# Patient Record
Sex: FEMALE | Race: BLACK OR AFRICAN AMERICAN | NOT HISPANIC OR LATINO | Employment: OTHER | URBAN - METROPOLITAN AREA
[De-identification: names, ages, dates, MRNs, and addresses within clinical notes are randomized per-mention and may not be internally consistent; named-entity substitution may affect disease eponyms.]

---

## 2017-02-23 ENCOUNTER — HOSPITAL ENCOUNTER (OUTPATIENT)
Dept: FAMILY MEDICINE CLINIC | Facility: CLINIC | Age: 67
Setting detail: SPECIMEN
Discharge: HOME | End: 2017-02-23
Attending: FAMILY MEDICINE | Admitting: FAMILY MEDICINE

## 2017-02-23 LAB
ALBUMIN SERPL-MCNC: 4 G/DL (ref 3.5–4.8)
ALBUMIN/GLOB SERPL: 1.1 {RATIO} (ref 1–1.7)
ALP SERPL-CCNC: 63 IU/L (ref 32–91)
ALT SERPL-CCNC: 27 IU/L (ref 14–54)
ANION GAP SERPL CALC-SCNC: 12.1 MMOL/L (ref 10–20)
AST SERPL-CCNC: 31 IU/L (ref 15–41)
BILIRUB SERPL-MCNC: 0.9 MG/DL (ref 0.3–1.2)
BUN SERPL-MCNC: 14 MG/DL (ref 8–20)
BUN/CREAT SERPL: 17.5 (ref 5.4–26.2)
CALCIUM SERPL-MCNC: 9.5 MG/DL (ref 8.9–10.3)
CHLORIDE SERPL-SCNC: 104 MMOL/L (ref 101–111)
CHOLEST SERPL-MCNC: 376 MG/DL
CHOLEST/HDLC SERPL: 4.6 {RATIO}
CONV CO2: 26 MMOL/L (ref 22–32)
CONV LDL CHOLESTEROL DIRECT: 252 MG/DL (ref 0–100)
CONV TOTAL PROTEIN: 7.7 G/DL (ref 6.1–7.9)
CREAT UR-MCNC: 0.8 MG/DL (ref 0.4–1)
GLOBULIN UR ELPH-MCNC: 3.7 G/DL (ref 2.5–3.8)
GLUCOSE SERPL-MCNC: 99 MG/DL (ref 65–99)
HDLC SERPL-MCNC: 82 MG/DL
LDLC/HDLC SERPL: 3.1 {RATIO}
LIPID INTERPRETATION: ABNORMAL
POTASSIUM SERPL-SCNC: 4.1 MMOL/L (ref 3.6–5.1)
SODIUM SERPL-SCNC: 138 MMOL/L (ref 136–144)
TRIGL SERPL-MCNC: 67 MG/DL
VLDLC SERPL CALC-MCNC: 42.1 MG/DL

## 2017-08-22 ENCOUNTER — HOSPITAL ENCOUNTER (OUTPATIENT)
Dept: FAMILY MEDICINE CLINIC | Facility: CLINIC | Age: 67
Setting detail: SPECIMEN
Discharge: HOME | End: 2017-08-22
Attending: FAMILY MEDICINE | Admitting: FAMILY MEDICINE

## 2017-08-22 LAB
ALBUMIN SERPL-MCNC: 4.3 G/DL (ref 3.5–4.8)
ALBUMIN/GLOB SERPL: 1.1 {RATIO} (ref 1–1.7)
ALP SERPL-CCNC: 61 IU/L (ref 32–91)
ALT SERPL-CCNC: 34 IU/L (ref 14–54)
ANION GAP SERPL CALC-SCNC: 13.6 MMOL/L (ref 10–20)
AST SERPL-CCNC: 35 IU/L (ref 15–41)
BILIRUB SERPL-MCNC: 0.9 MG/DL (ref 0.3–1.2)
BUN SERPL-MCNC: 13 MG/DL (ref 8–20)
BUN/CREAT SERPL: 13 (ref 5.4–26.2)
CALCIUM SERPL-MCNC: 10 MG/DL (ref 8.9–10.3)
CHLORIDE SERPL-SCNC: 104 MMOL/L (ref 101–111)
CHOLEST SERPL-MCNC: 357 MG/DL
CHOLEST/HDLC SERPL: 4.3 {RATIO}
CONV CO2: 26 MMOL/L (ref 22–32)
CONV LDL CHOLESTEROL DIRECT: 246 MG/DL (ref 0–100)
CONV TOTAL PROTEIN: 8.1 G/DL (ref 6.1–7.9)
CREAT UR-MCNC: 1 MG/DL (ref 0.4–1)
GLOBULIN UR ELPH-MCNC: 3.8 G/DL (ref 2.5–3.8)
GLUCOSE SERPL-MCNC: 110 MG/DL (ref 65–99)
HDLC SERPL-MCNC: 83 MG/DL
LDLC/HDLC SERPL: 3 {RATIO}
LIPID INTERPRETATION: ABNORMAL
POTASSIUM SERPL-SCNC: 4.6 MMOL/L (ref 3.6–5.1)
SODIUM SERPL-SCNC: 139 MMOL/L (ref 136–144)
TRIGL SERPL-MCNC: 134 MG/DL
VLDLC SERPL CALC-MCNC: 28.8 MG/DL

## 2018-01-23 ENCOUNTER — HOSPITAL ENCOUNTER (OUTPATIENT)
Dept: FAMILY MEDICINE CLINIC | Facility: CLINIC | Age: 68
Setting detail: SPECIMEN
Discharge: HOME | End: 2018-01-23
Attending: FAMILY MEDICINE | Admitting: FAMILY MEDICINE

## 2018-01-23 LAB
ALBUMIN SERPL-MCNC: 4.2 G/DL (ref 3.5–4.8)
ALBUMIN/GLOB SERPL: 1.2 {RATIO} (ref 1–1.7)
ALP SERPL-CCNC: 58 IU/L (ref 32–91)
ALT SERPL-CCNC: 23 IU/L (ref 14–54)
ANION GAP SERPL CALC-SCNC: 12.4 MMOL/L (ref 10–20)
AST SERPL-CCNC: 26 IU/L (ref 15–41)
BASOPHILS # BLD AUTO: 0 10*3/UL (ref 0–0.2)
BASOPHILS NFR BLD AUTO: 0 % (ref 0–2)
BILIRUB SERPL-MCNC: 0.9 MG/DL (ref 0.3–1.2)
BUN SERPL-MCNC: 13 MG/DL (ref 8–20)
BUN/CREAT SERPL: 10.8 (ref 5.4–26.2)
CALCIUM SERPL-MCNC: 9.8 MG/DL (ref 8.9–10.3)
CHLORIDE SERPL-SCNC: 102 MMOL/L (ref 101–111)
CONV CO2: 26 MMOL/L (ref 22–32)
CONV TOTAL PROTEIN: 7.7 G/DL (ref 6.1–7.9)
CREAT UR-MCNC: 1.2 MG/DL (ref 0.4–1)
DIFFERENTIAL METHOD BLD: (no result)
EOSINOPHIL # BLD AUTO: 0 10*3/UL (ref 0–0.3)
EOSINOPHIL # BLD AUTO: 1 % (ref 0–3)
ERYTHROCYTE [DISTWIDTH] IN BLOOD BY AUTOMATED COUNT: 13.9 % (ref 11.5–14.5)
GLOBULIN UR ELPH-MCNC: 3.5 G/DL (ref 2.5–3.8)
GLUCOSE SERPL-MCNC: 115 MG/DL (ref 65–99)
HCT VFR BLD AUTO: 40.6 % (ref 35–49)
HGB BLD-MCNC: 13.3 G/DL (ref 12–15)
LYMPHOCYTES # BLD AUTO: 1.3 10*3/UL (ref 0.8–4.8)
LYMPHOCYTES NFR BLD AUTO: 26 % (ref 18–42)
MCH RBC QN AUTO: 26.7 PG (ref 26–32)
MCHC RBC AUTO-ENTMCNC: 32.8 G/DL (ref 32–36)
MCV RBC AUTO: 81.3 FL (ref 80–94)
MONOCYTES # BLD AUTO: 0.5 10*3/UL (ref 0.1–1.3)
MONOCYTES NFR BLD AUTO: 10 % (ref 2–11)
NEUTROPHILS # BLD AUTO: 3.1 10*3/UL (ref 2.3–8.6)
NEUTROPHILS NFR BLD AUTO: 63 % (ref 50–75)
NRBC BLD AUTO-RTO: 0 /100{WBCS}
NRBC/RBC NFR BLD MANUAL: 0 10*3/UL
PLATELET # BLD AUTO: 218 10*3/UL (ref 150–450)
PMV BLD AUTO: 9.4 FL (ref 7.4–10.4)
POTASSIUM SERPL-SCNC: 4.4 MMOL/L (ref 3.6–5.1)
RBC # BLD AUTO: 4.99 10*6/UL (ref 4–5.4)
SODIUM SERPL-SCNC: 136 MMOL/L (ref 136–144)
WBC # BLD AUTO: 5.1 10*3/UL (ref 4.5–11.5)

## 2018-02-22 ENCOUNTER — HOSPITAL ENCOUNTER (OUTPATIENT)
Dept: FAMILY MEDICINE CLINIC | Facility: CLINIC | Age: 68
Setting detail: SPECIMEN
Discharge: HOME | End: 2018-02-22
Attending: FAMILY MEDICINE | Admitting: FAMILY MEDICINE

## 2018-02-22 LAB
CHOLEST SERPL-MCNC: 359 MG/DL
CHOLEST/HDLC SERPL: 4.3 {RATIO}
CONV LDL CHOLESTEROL DIRECT: 245 MG/DL (ref 0–100)
HDLC SERPL-MCNC: 83 MG/DL
LDLC/HDLC SERPL: 2.9 {RATIO}
LIPID INTERPRETATION: ABNORMAL
TRIGL SERPL-MCNC: 62 MG/DL
VLDLC SERPL CALC-MCNC: 30.9 MG/DL

## 2018-08-30 ENCOUNTER — HOSPITAL ENCOUNTER (OUTPATIENT)
Dept: FAMILY MEDICINE CLINIC | Facility: CLINIC | Age: 68
Setting detail: SPECIMEN
Discharge: HOME | End: 2018-08-30
Attending: FAMILY MEDICINE | Admitting: FAMILY MEDICINE

## 2018-08-30 LAB
ALBUMIN SERPL-MCNC: 4.1 G/DL (ref 3.5–4.8)
ALBUMIN/GLOB SERPL: 1.2 {RATIO} (ref 1–1.7)
ALP SERPL-CCNC: 67 IU/L (ref 32–91)
ALT SERPL-CCNC: 26 IU/L (ref 14–54)
ANION GAP SERPL CALC-SCNC: 11.4 MMOL/L (ref 10–20)
AST SERPL-CCNC: 26 IU/L (ref 15–41)
BILIRUB SERPL-MCNC: 0.9 MG/DL (ref 0.3–1.2)
BUN SERPL-MCNC: 12 MG/DL (ref 8–20)
BUN/CREAT SERPL: 13.3 (ref 5.4–26.2)
CALCIUM SERPL-MCNC: 9.6 MG/DL (ref 8.9–10.3)
CHLORIDE SERPL-SCNC: 103 MMOL/L (ref 101–111)
CHOLEST SERPL-MCNC: 338 MG/DL
CHOLEST/HDLC SERPL: 3.6 {RATIO}
CONV CO2: 28 MMOL/L (ref 22–32)
CONV LDL CHOLESTEROL DIRECT: 213 MG/DL (ref 0–100)
CONV TOTAL PROTEIN: 7.5 G/DL (ref 6.1–7.9)
CREAT UR-MCNC: 0.9 MG/DL (ref 0.4–1)
GLOBULIN UR ELPH-MCNC: 3.4 G/DL (ref 2.5–3.8)
GLUCOSE SERPL-MCNC: 102 MG/DL (ref 65–99)
HDLC SERPL-MCNC: 95 MG/DL
LDLC/HDLC SERPL: 2.2 {RATIO}
LIPID INTERPRETATION: ABNORMAL
POTASSIUM SERPL-SCNC: 4.4 MMOL/L (ref 3.6–5.1)
SODIUM SERPL-SCNC: 138 MMOL/L (ref 136–144)
TRIGL SERPL-MCNC: 89 MG/DL
VLDLC SERPL CALC-MCNC: 30.4 MG/DL

## 2019-02-28 ENCOUNTER — HOSPITAL ENCOUNTER (OUTPATIENT)
Dept: FAMILY MEDICINE CLINIC | Facility: CLINIC | Age: 69
Setting detail: SPECIMEN
Discharge: HOME | End: 2019-02-28
Attending: FAMILY MEDICINE | Admitting: FAMILY MEDICINE

## 2019-02-28 LAB
ALBUMIN SERPL-MCNC: 4.1 G/DL (ref 3.5–4.8)
ALBUMIN/GLOB SERPL: 1.2 {RATIO} (ref 1–1.7)
ALP SERPL-CCNC: 65 IU/L (ref 32–91)
ALT SERPL-CCNC: 23 IU/L (ref 14–54)
ANION GAP SERPL CALC-SCNC: 13.9 MMOL/L (ref 10–20)
AST SERPL-CCNC: 25 IU/L (ref 15–41)
BILIRUB SERPL-MCNC: 0.8 MG/DL (ref 0.3–1.2)
BUN SERPL-MCNC: 16 MG/DL (ref 8–20)
BUN/CREAT SERPL: 20 (ref 5.4–26.2)
CALCIUM SERPL-MCNC: 9.3 MG/DL (ref 8.9–10.3)
CHLORIDE SERPL-SCNC: 101 MMOL/L (ref 101–111)
CHOLEST SERPL-MCNC: 332 MG/DL
CHOLEST/HDLC SERPL: 3.6 {RATIO}
CONV CO2: 24 MMOL/L (ref 22–32)
CONV LDL CHOLESTEROL DIRECT: 223 MG/DL (ref 0–100)
CONV TOTAL PROTEIN: 7.4 G/DL (ref 6.1–7.9)
CREAT UR-MCNC: 0.8 MG/DL (ref 0.4–1)
GLOBULIN UR ELPH-MCNC: 3.3 G/DL (ref 2.5–3.8)
GLUCOSE SERPL-MCNC: 95 MG/DL (ref 65–99)
HDLC SERPL-MCNC: 93 MG/DL
LDLC/HDLC SERPL: 2.4 {RATIO}
LIPID INTERPRETATION: ABNORMAL
POTASSIUM SERPL-SCNC: 3.9 MMOL/L (ref 3.6–5.1)
SODIUM SERPL-SCNC: 135 MMOL/L (ref 136–144)
TRIGL SERPL-MCNC: 69 MG/DL
VLDLC SERPL CALC-MCNC: 16.6 MG/DL

## 2019-07-12 ENCOUNTER — TELEPHONE (OUTPATIENT)
Dept: FAMILY MEDICINE CLINIC | Facility: CLINIC | Age: 69
End: 2019-07-12

## 2019-07-12 ENCOUNTER — TRANSCRIBE ORDERS (OUTPATIENT)
Dept: FAMILY MEDICINE CLINIC | Facility: CLINIC | Age: 69
End: 2019-07-12

## 2019-07-12 ENCOUNTER — TRANSCRIBE ORDERS (OUTPATIENT)
Dept: ADMINISTRATIVE | Facility: HOSPITAL | Age: 69
End: 2019-07-12

## 2019-07-12 DIAGNOSIS — Z12.39 SCREENING FOR BREAST CANCER: Primary | ICD-10-CM

## 2019-07-12 NOTE — TELEPHONE ENCOUNTER
ORDER INDEXED FOR Merged with [facility]. PT WILL JUST NEED TO CALL TO SCHEDULE. PT NOTIFIED.

## 2019-07-17 ENCOUNTER — OFFICE VISIT (OUTPATIENT)
Dept: CARDIOLOGY | Facility: CLINIC | Age: 69
End: 2019-07-17

## 2019-07-17 VITALS
DIASTOLIC BLOOD PRESSURE: 94 MMHG | HEIGHT: 68 IN | BODY MASS INDEX: 35.16 KG/M2 | OXYGEN SATURATION: 98 % | RESPIRATION RATE: 18 BRPM | SYSTOLIC BLOOD PRESSURE: 164 MMHG | WEIGHT: 232 LBS | HEART RATE: 92 BPM

## 2019-07-17 DIAGNOSIS — I10 ESSENTIAL HYPERTENSION: ICD-10-CM

## 2019-07-17 DIAGNOSIS — I25.83 CORONARY ARTERY DISEASE DUE TO LIPID RICH PLAQUE: ICD-10-CM

## 2019-07-17 DIAGNOSIS — E78.2 MIXED HYPERLIPIDEMIA: Primary | ICD-10-CM

## 2019-07-17 DIAGNOSIS — I25.10 CORONARY ARTERY DISEASE DUE TO LIPID RICH PLAQUE: ICD-10-CM

## 2019-07-17 PROCEDURE — 99214 OFFICE O/P EST MOD 30 MIN: CPT | Performed by: INTERNAL MEDICINE

## 2019-07-17 NOTE — PROGRESS NOTES
Subjective:     Encounter Date:07/17/2019      Patient ID: Bigg Spear is a 68 y.o. female.    Chief Complaint:  No chief complaint on file.      HPI:  67-year-old female patient with coronary artery risk factor significant for hypertension dyslipidemia who presented to [facility] with hypertensive emergency and a troponin elevation.  She underwent cardiac catheterization (4/27/2018: No more than 10 to 20% plaque with mildly elevated left ventricular end-diastolic filling pressures).    She returns today for routine follow-up.  We spent more than 30 minutes discussing dietary regimen and exercise regimen.    The following portions of the patient's history were reviewed and updated as appropriate: allergies, current medications, past family history, past medical history, past social history, past surgical history and problem list.    Problem List:  Patient Active Problem List   Diagnosis   • Mixed hyperlipidemia   • Essential hypertension   • Coronary artery disease due to lipid rich plaque       Past Medical History:  Past Medical History:   Diagnosis Date   • Coronary artery disease due to lipid rich plaque 7/17/2019   • Coronary artery disease due to lipid rich plaque 7/17/2019   • Hyperlipidemia    • Hypertension    • NSTEMI (non-ST elevated myocardial infarction) (CMS/Former[facility]) 04/27/2018       Past Surgical History:  Past Surgical History:   Procedure Laterality Date   • CARDIAC CATHETERIZATION  04/28/2019    Mildly elevated left heart filling pressures, normal epicardial anatomy with no significant CAD       Social History:  Social History     Socioeconomic History   • Marital status:      Spouse name: Not on file   • Number of children: Not on file   • Years of education: Not on file   • Highest education level: Not on file   Tobacco Use   • Smoking status: Never Smoker   • Smokeless tobacco: Never Used   Substance and Sexual Activity   • Alcohol use: No     Frequency: Never   • Drug  "use: No       Allergies:  Allergies   Allergen Reactions   • Contrast Dye Arrhythmia         Review of Symptoms:  Constitutional: Patient afebrile no chills or unexpected weight changes  Respiratory: No cough, no wheezing or dyspnea  Cardiovascular: No chest pain, palpitations, dyspnea, orthopnea and no edema  Gastrointestinal: No nausea, vomiting, constipation or diarrhea.  No melena or dark stools    All other systems reviewed and are negative         Objective:         /94 (BP Location: Left arm, Patient Position: Sitting, Cuff Size: Large Adult)   Pulse 92   Resp 18   Ht 172.7 cm (68\")   Wt 105 kg (232 lb)   SpO2 98%   BMI 35.28 kg/m²     Physical exam  Constitutional: well-nourished, and appears stated age in no acute distress  PERRL: Conjunctiva clear, no pallor, anicteric  HENMT: normocephalic, normal dentition, no cyanosis or pallor  Neck:no bruits, or thrills and bilateral normal carotid upstroke. Normal jugular venous pressure  Cardiovascular: No parasternal heaves an non-displaced focal PMI. Normal rate and rhythm: no rub, gallop, murmur or click and normal S1 and S2; no lower or upper extremity edema.   Lungs: unlabored, no wheezing with no rales or rhonchi on auscultation.  Extremities: Warm, no clubbing, cyanosis, or edema. Full and equal peripheral pulses in extremities with no bruits appreciated.   Abdomen: soft, non-tender, non-distended  Musculoskeletal: no joint tenderness or swelling and no erythema  Skin: Warm and dry, non-erythematous   Neuro:alert and normal affect. Oriented to time, place and person.       In-Office Procedure(s):  Procedures    ASCVD RIsk Score::  The ASCVD Risk score (Jcarlos DC Jr., et al., 2013) failed to calculate for the following reasons:    The valid total cholesterol range is 130 to 320 mg/dL    Recent Radiology:  Imaging Results (most recent)     None          Lab Review:   No visits with results within 2 Month(s) from this visit.   Latest known visit with " results is:   Hospital Outpatient Visit on 02/28/2019   Component Date Value   • Total Cholesterol 02/28/2019 332*   • Triglycerides 02/28/2019 69    • HDL Cholesterol 02/28/2019 93    • LDL Cholesterol  02/28/2019 223*   • VLDL Cholesterol 02/28/2019 16.6    • LDL/HDL Ratio 02/28/2019 2.4    • Chol/HDL Ratio 02/28/2019 3.6    • Lipid Interpretation 02/28/2019 Total Cholesterol, Total Triglycerides, LDL Cholesterol,and HDL Cholesterol:     • Lipid Interpretation 02/28/2019 TOTAL CHOLESTEROL                    HDL CHOLESTEROL  Desirable:               • Lipid Interpretation 02/28/2019 <200 mg/dL     Low HDL:            <40 mg/dL  Borderline High:   200-239 mg/dL     • Lipid Interpretation 02/28/2019    Normal:           40-60 mg/dL  High:           > or = 240 mg/dL        • Lipid Interpretation 02/28/2019 Desirable:          >60 mg/dL  TOTAL TRIGLYCERIDE                   LDL    • Lipid Interpretation 02/28/2019 CHOLESTEROL  Normal:               <150 mg/dL     Optimal:           <100 mg/dL    • Lipid Interpretation 02/28/2019  Borderline High:   150-199 mg/dL     Low Risk:       100-129 mg/dL  High:         • Lipid Interpretation 02/28/2019         200-499 mg/dL     Borderline High:130-159 mg/dL  Very High:      > or =    • Lipid Interpretation 02/28/2019 500 mg/dL     High:           160-189 mg/dL                                        • Lipid Interpretation 02/28/2019   Very High:   > or = 190 mg/dL  The following ratios of LDL to HDL and Total    • Lipid Interpretation 02/28/2019 Cholesterol to HDL are for information only:  LDL/HDL RATIO                        • Lipid Interpretation 02/28/2019    TOTAL CHOLESTEROL/HDL RATIO  Desirable:              <5           Low Risk:     • Lipid Interpretation 02/28/2019      3.3-4.4   Optimal:        < or = 3.5           Average Risk:   4.4-7.1        • Lipid Interpretation 02/28/2019                                    Medium Risk:    7.1-11                          • Lipid  Interpretation 02/28/2019                   High Risk:         >11       • Sodium 02/28/2019 135*   • Potassium 02/28/2019 3.9    • Chloride 02/28/2019 101    • CO2 02/28/2019 24    • Glucose 02/28/2019 95    • BUN 02/28/2019 16    • Creatinine 02/28/2019 0.8    • Calcium 02/28/2019 9.3    • Total Protein 02/28/2019 7.4    • Albumin 02/28/2019 4.1    • Total Bilirubin 02/28/2019 0.8    • Alkaline Phosphatase 02/28/2019 65    • AST (SGOT) 02/28/2019 25    • ALT (SGPT) 02/28/2019 23    • Anion Gap 02/28/2019 13.9    • BUN/Creatinine Ratio 02/28/2019 20.0    • GFR MDRD Non  Neeta* 02/28/2019 >60    • GFR MDRD  02/28/2019 >60    • Globulin 02/28/2019 3.3    • A/G Ratio 02/28/2019 1.2               Invalid input(s): ALKPO4                        Invalid input(s): LDLCALC                Assessment:          Diagnosis Plan   1. Mixed hyperlipidemia     2. Essential hypertension     3. Coronary artery disease due to lipid rich plaque            Plan:         1. Mixed hyperlipidemia  Controlled on medical therapy    2. Essential hypertension  Well-controlled on medical therapy.    3. Coronary artery disease due to lipid rich plaque  Nonobstructive.      Greater than 30 minutes of face-to-face time the patient, of which greater than 50% was spent counseling specifically discussing carbohydrate restriction and her dietary intake with increasing vegetable intake and increasing her exercise tolerance through walking 3 days a week.      Level of Care:                 Ed Quinn MD  07/17/19  .

## 2019-07-29 ENCOUNTER — HOSPITAL ENCOUNTER (OUTPATIENT)
Dept: MAMMOGRAPHY | Facility: HOSPITAL | Age: 69
Discharge: HOME | End: 2019-07-29
Admitting: FAMILY MEDICINE

## 2019-07-29 DIAGNOSIS — Z12.39 SCREENING FOR BREAST CANCER: ICD-10-CM

## 2019-07-29 PROCEDURE — 77067 SCR MAMMO BI INCL CAD: CPT

## 2019-07-29 PROCEDURE — 77063 BREAST TOMOSYNTHESIS BI: CPT

## 2019-09-18 ENCOUNTER — LAB (OUTPATIENT)
Dept: FAMILY MEDICINE CLINIC | Facility: CLINIC | Age: 69
End: 2019-09-18

## 2019-09-18 ENCOUNTER — OFFICE VISIT (OUTPATIENT)
Dept: FAMILY MEDICINE CLINIC | Facility: CLINIC | Age: 69
End: 2019-09-18

## 2019-09-18 VITALS
DIASTOLIC BLOOD PRESSURE: 76 MMHG | HEART RATE: 84 BPM | HEIGHT: 68 IN | SYSTOLIC BLOOD PRESSURE: 169 MMHG | WEIGHT: 226.2 LBS | OXYGEN SATURATION: 96 % | BODY MASS INDEX: 34.28 KG/M2 | TEMPERATURE: 97.6 F

## 2019-09-18 DIAGNOSIS — I10 ESSENTIAL HYPERTENSION: ICD-10-CM

## 2019-09-18 DIAGNOSIS — E78.2 MIXED HYPERLIPIDEMIA: ICD-10-CM

## 2019-09-18 DIAGNOSIS — E78.2 MIXED HYPERLIPIDEMIA: Primary | ICD-10-CM

## 2019-09-18 PROBLEM — L98.9 SKIN LESION: Status: ACTIVE | Noted: 2018-01-23

## 2019-09-18 PROBLEM — F41.9 ANXIETY DISORDER: Status: ACTIVE | Noted: 2019-09-18

## 2019-09-18 LAB
ALBUMIN SERPL-MCNC: 4.3 G/DL (ref 3.5–4.8)
ALBUMIN/GLOB SERPL: 1.4 G/DL (ref 1–1.7)
ALP SERPL-CCNC: 64 U/L (ref 32–91)
ALT SERPL W P-5'-P-CCNC: 26 U/L (ref 14–54)
ANION GAP SERPL CALCULATED.3IONS-SCNC: 12.1 MMOL/L (ref 5–15)
ARTICHOKE IGE QN: 274 MG/DL (ref 0–100)
AST SERPL-CCNC: 27 U/L (ref 15–41)
BILIRUB SERPL-MCNC: 0.8 MG/DL (ref 0.3–1.2)
BUN BLD-MCNC: 17 MG/DL (ref 8–20)
BUN/CREAT SERPL: 21.3 (ref 5.4–26.2)
CALCIUM SPEC-SCNC: 9.2 MG/DL (ref 8.9–10.3)
CHLORIDE SERPL-SCNC: 103 MMOL/L (ref 101–111)
CHOLEST SERPL-MCNC: 368 MG/DL
CO2 SERPL-SCNC: 25 MMOL/L (ref 22–32)
CREAT BLD-MCNC: 0.8 MG/DL (ref 0.4–1)
GFR SERPL CREATININE-BSD FRML MDRD: 86 ML/MIN/1.73
GLOBULIN UR ELPH-MCNC: 3 GM/DL (ref 2.5–3.8)
GLUCOSE BLD-MCNC: 94 MG/DL (ref 65–99)
HDLC SERPL QL: 4.23
HDLC SERPL-MCNC: 87 MG/DL
LDLC/HDLC SERPL: 3.05 {RATIO}
POTASSIUM BLD-SCNC: 4.1 MMOL/L (ref 3.6–5.1)
PROT SERPL-MCNC: 7.3 G/DL (ref 6.1–7.9)
SODIUM BLD-SCNC: 136 MMOL/L (ref 136–144)
TRIGL SERPL-MCNC: 78 MG/DL
VLDLC SERPL-MCNC: 15.6 MG/DL

## 2019-09-18 PROCEDURE — 80053 COMPREHEN METABOLIC PANEL: CPT | Performed by: FAMILY MEDICINE

## 2019-09-18 PROCEDURE — 36415 COLL VENOUS BLD VENIPUNCTURE: CPT | Performed by: FAMILY MEDICINE

## 2019-09-18 PROCEDURE — 80061 LIPID PANEL: CPT | Performed by: FAMILY MEDICINE

## 2019-09-18 PROCEDURE — 99213 OFFICE O/P EST LOW 20 MIN: CPT | Performed by: FAMILY MEDICINE

## 2019-09-18 NOTE — PROGRESS NOTES
Subjective   Bigg Spear is a 68 y.o. female.     Here for follow up on bp and chol  bp was 122/81 at home this am  Feels well         The following portions of the patient's history were reviewed and updated as appropriate: allergies, current medications, past family history, past medical history, past social history, past surgical history and problem list.  Past Medical History:   Diagnosis Date   • Coronary artery disease due to lipid rich plaque 7/17/2019   • Coronary artery disease due to lipid rich plaque 7/17/2019   • Fibrocystic breast    • Hyperlipidemia    • Hypertension    • NSTEMI (non-ST elevated myocardial infarction) (CMS/[facility]) 04/27/2018     Past Surgical History:   Procedure Laterality Date   • CARDIAC CATHETERIZATION  04/28/2019    Mildly elevated left heart filling pressures, normal epicardial anatomy with no significant CAD     Family History   Problem Relation Age of Onset   • No Known Problems Mother    • No Known Problems Father    • No Known Problems Sister    • Colon cancer Brother    • No Known Problems Daughter    • No Known Problems Son    • No Known Problems Maternal Grandmother    • No Known Problems Paternal Grandmother    • No Known Problems Maternal Aunt    • No Known Problems Paternal Aunt      Social History     Socioeconomic History   • Marital status:      Spouse name: Not on file   • Number of children: Not on file   • Years of education: Not on file   • Highest education level: Not on file   Tobacco Use   • Smoking status: Never Smoker   • Smokeless tobacco: Never Used   Substance and Sexual Activity   • Alcohol use: No     Frequency: Never   • Drug use: No         Current Outpatient Medications:   •  amLODIPine (NORVASC) 5 MG tablet, Take 5 mg by mouth Daily., Disp: , Rfl:   •  aspirin 81 MG EC tablet, Take 81 mg by mouth Daily., Disp: , Rfl:   •  atorvastatin (LIPITOR) 40 MG tablet, Take 40 mg by mouth Daily., Disp: , Rfl:   •  Garlic 1000 MG capsule, Take 1 tablet  "by mouth Daily., Disp: , Rfl:   •  lisinopril (PRINIVIL,ZESTRIL) 20 MG tablet, Take 20 mg by mouth Daily., Disp: , Rfl:   •  Multiple Vitamins-Minerals (MULTIVITAMIN WITH MINERALS) tablet tablet, Take 1 tablet by mouth Daily., Disp: , Rfl:     Review of Systems   Constitutional: Negative for diaphoresis, fatigue, fever, unexpected weight gain and unexpected weight loss.   Respiratory: Negative for cough, chest tightness and shortness of breath.    Cardiovascular: Negative for chest pain, palpitations and leg swelling.   Gastrointestinal: Negative for nausea and vomiting.   Neurological: Negative for dizziness, syncope and headache.     /76 (BP Location: Left arm, Patient Position: Sitting, Cuff Size: Adult)   Pulse 84   Temp 97.6 °F (36.4 °C) (Oral)   Ht 172.7 cm (68\")   Wt 103 kg (226 lb 3.2 oz)   SpO2 96%   BMI 34.39 kg/m²       Objective   Physical Exam   Constitutional: She appears well-developed and well-nourished. No distress. She is obese.  HENT:   Head: Normocephalic and atraumatic.   Neck: Neck supple. No JVD present. No thyromegaly present.   Cardiovascular: Normal rate, regular rhythm, normal heart sounds and intact distal pulses. Exam reveals no gallop and no friction rub.   No murmur heard.  Pulmonary/Chest: Effort normal and breath sounds normal. No respiratory distress. She has no wheezes. She has no rales.   Musculoskeletal: She exhibits no edema.   Lymphadenopathy:     She has no cervical adenopathy.   Neurological: She is alert.   Skin: Skin is warm and dry.   Nursing note and vitals reviewed.    Refused flu and pneumonia vaccines    Assessment/Plan   Problems Addressed this Visit        Cardiovascular and Mediastinum    Hyperlipidemia - Primary    Relevant Orders    Lipid Panel (Completed)    Comprehensive Metabolic Panel (Completed)    Hypertension    Relevant Orders    Lipid Panel (Completed)    Comprehensive Metabolic Panel (Completed)                 "

## 2019-10-01 RX ORDER — LISINOPRIL 20 MG/1
TABLET ORAL
Qty: 90 TABLET | Refills: 2 | Status: SHIPPED | OUTPATIENT
Start: 2019-10-01 | End: 2020-05-26

## 2020-02-19 ENCOUNTER — OFFICE VISIT (OUTPATIENT)
Dept: CARDIOLOGY | Facility: CLINIC | Age: 70
End: 2020-02-19

## 2020-02-19 VITALS
WEIGHT: 228 LBS | SYSTOLIC BLOOD PRESSURE: 130 MMHG | HEART RATE: 82 BPM | HEIGHT: 68 IN | BODY MASS INDEX: 34.56 KG/M2 | DIASTOLIC BLOOD PRESSURE: 82 MMHG | RESPIRATION RATE: 18 BRPM | OXYGEN SATURATION: 98 %

## 2020-02-19 DIAGNOSIS — I25.83 CORONARY ARTERY DISEASE DUE TO LIPID RICH PLAQUE: ICD-10-CM

## 2020-02-19 DIAGNOSIS — E78.2 MIXED HYPERLIPIDEMIA: Primary | ICD-10-CM

## 2020-02-19 DIAGNOSIS — I25.10 CORONARY ARTERY DISEASE DUE TO LIPID RICH PLAQUE: ICD-10-CM

## 2020-02-19 DIAGNOSIS — I10 ESSENTIAL HYPERTENSION: ICD-10-CM

## 2020-02-19 PROCEDURE — 99214 OFFICE O/P EST MOD 30 MIN: CPT | Performed by: INTERNAL MEDICINE

## 2020-02-19 NOTE — PROGRESS NOTES
Subjective:     Encounter Date:02/19/2020      Patient ID: Bigg Spear is a 69 y.o. female.    Chief Complaint:  Chief Complaint   Patient presents with   • Coronary Artery Disease   • Hypertension   • Hyperlipidemia       HPI:  67-year-old female patient with coronary artery risk factor significant for hypertension dyslipidemia who presented to [facility] with hypertensive emergency and a troponin elevation.  She underwent cardiac catheterization (4/27/2018: No more than 10 to 20% plaque with mildly elevated left ventricular end-diastolic filling pressures).    She returns today for routine follow-up.  I personally reviewed her most recent lipid profile Trg 78 HD 87  with and extremely favorable Trg/Hd ratio of ~0.9 indicating non-inflammatory state.  Amazingly, despite her severely elevated LDL her remaining lipid profile shows indicates cardioprotection; with a risk of developing death from coronary heart disease of less than 2% by standard classification.  She has no complaints from a cardiovascular standpoint.  We had a long discussion of eliminating carbohydrate rich foods from her diet and focusing on healthy fats and protein with cooked vegetables and fruits as an adjunctive.    The following portions of the patient's history were reviewed and updated as appropriate: allergies, current medications, past family history, past medical history, past social history, past surgical history and problem list.    Problem List:  Patient Active Problem List   Diagnosis   • Hyperlipidemia   • Hypertension   • Coronary artery disease due to lipid rich plaque   • Anxiety disorder   • Carpal tunnel syndrome   • Encounter for general adult medical examination without abnormal findings   • History of prosthetic heart valve   • Obesity   • Encounter for screening for malignant neoplasm of colon   • Postmenopausal status   • Skin lesion   • Vitamin D deficiency       Past Medical History:  Past  "Medical History:   Diagnosis Date   • Coronary artery disease due to lipid rich plaque 7/17/2019   • Coronary artery disease due to lipid rich plaque 7/17/2019   • Fibrocystic breast    • Hyperlipidemia    • Hypertension    • NSTEMI (non-ST elevated myocardial infarction) (CMS/[facility]) 04/27/2018       Past Surgical History:  Past Surgical History:   Procedure Laterality Date   • CARDIAC CATHETERIZATION  04/28/2019    Mildly elevated left heart filling pressures, normal epicardial anatomy with no significant CAD       Social History:  Social History     Socioeconomic History   • Marital status:      Spouse name: Not on file   • Number of children: Not on file   • Years of education: Not on file   • Highest education level: Not on file   Tobacco Use   • Smoking status: Never Smoker   • Smokeless tobacco: Never Used   Substance and Sexual Activity   • Alcohol use: No     Frequency: Never   • Drug use: No   • Sexual activity: Defer       Allergies:  Allergies   Allergen Reactions   • Dioxybenzone Shortness Of Breath   • Contrast Dye Arrhythmia   • Amoxicillin Palpitations         Review of Symptoms:  Constitutional: Patient afebrile no chills or unexpected weight changes  Respiratory: No cough, no wheezing or dyspnea  Cardiovascular: No chest pain, palpitations, dyspnea, orthopnea and no edema  Gastrointestinal: No nausea, vomiting, constipation or diarrhea.  No melena or dark stools    All other systems reviewed and are negative         Objective:         /82 (BP Location: Left arm, Patient Position: Sitting, Cuff Size: Large Adult)   Pulse 82   Resp 18   Ht 172.7 cm (68\")   Wt 103 kg (228 lb)   SpO2 98%   BMI 34.67 kg/m²     Physical exam  Constitutional: well-nourished, and appears stated age in no acute distress  PERRL: Conjunctiva clear, no pallor, anicteric  HENMT: normocephalic, normal dentition, no cyanosis or pallor  Neck:no bruits, or thrills and bilateral normal carotid upstroke. Normal " jugular venous pressure  Cardiovascular: No parasternal heaves an non-displaced focal PMI. Normal rate and rhythm: no rub, gallop, murmur or click and normal S1 and S2; no lower or upper extremity edema.   Lungs: unlabored, no wheezing with no rales or rhonchi on auscultation.  Extremities: Warm, no clubbing, cyanosis. Full and equal peripheral pulses in extremities with no bruits appreciated.   Abdomen: soft, non-tender, non-distended  Musculoskeletal: no joint tenderness or swelling and no erythema  Skin: Warm and dry, non-erythematous   Neuro:alert and normal affect. Oriented to time, place and person.       In-Office Procedure(s):  Procedures    ASCVD RIsk Score::  The ASCVD Risk score (Jcarlosjude BARTON Jr., et al., 2013) failed to calculate for the following reasons:    The valid total cholesterol range is 130 to 320 mg/dL    Recent Radiology:  Imaging Results (Most Recent)     None          Lab Review:   No visits with results within 2 Month(s) from this visit.   Latest known visit with results is:   Lab on 09/18/2019   Component Date Value   • Total Cholesterol 09/18/2019 368*   • Triglycerides 09/18/2019 78    • HDL Cholesterol 09/18/2019 87    • LDL Cholesterol  09/18/2019 274*   • VLDL Cholesterol 09/18/2019 15.6    • LDL/HDL Ratio 09/18/2019 3.05    • Chol/HDL Ratio 09/18/2019 4.23               Invalid input(s): ALKPO4                        Invalid input(s): LDLCALC                Assessment:          Diagnosis Plan   1. Mixed hyperlipidemia  Lipid Panel   2. Coronary artery disease due to lipid rich plaque     3. Essential hypertension            Plan:         1. Mixed hyperlipidemia  Will implement dietary modifications with increase in exercise.  Patient has a strong aversion to statins.  Repeat lipid profile in 6 weeks  - Lipid Panel; Future    2. Coronary artery disease due to lipid rich plaque  Clinically silent nonobstructive; she has no more than 10 to 20% luminal irregularities in her coronary  arteries.    3. Essential hypertension  Well-controlled on medical therapy    Greater than 30 minutes of face-to-face time was spent with the patient and family, of which greater than 50% was spent counseling specifically discussing dyslipidemia, treatment with medical and dietary therapy.        Level of Care:                 Ed Quinn MD  02/19/20  .

## 2020-02-27 ENCOUNTER — OFFICE VISIT (OUTPATIENT)
Dept: FAMILY MEDICINE CLINIC | Facility: CLINIC | Age: 70
End: 2020-02-27

## 2020-02-27 VITALS
DIASTOLIC BLOOD PRESSURE: 79 MMHG | HEART RATE: 68 BPM | OXYGEN SATURATION: 97 % | SYSTOLIC BLOOD PRESSURE: 183 MMHG | TEMPERATURE: 97.8 F | BODY MASS INDEX: 34.97 KG/M2 | WEIGHT: 230 LBS

## 2020-02-27 DIAGNOSIS — R06.2 WHEEZING: ICD-10-CM

## 2020-02-27 DIAGNOSIS — I10 ESSENTIAL HYPERTENSION: ICD-10-CM

## 2020-02-27 DIAGNOSIS — I10 WHITE COAT SYNDROME WITH DIAGNOSIS OF HYPERTENSION: Primary | ICD-10-CM

## 2020-02-27 DIAGNOSIS — R00.2 PALPITATIONS: ICD-10-CM

## 2020-02-27 PROCEDURE — 99214 OFFICE O/P EST MOD 30 MIN: CPT | Performed by: PHYSICIAN ASSISTANT

## 2020-02-27 RX ORDER — PREDNISONE 20 MG/1
20 TABLET ORAL DAILY
Qty: 3 TABLET | Refills: 0 | Status: SHIPPED | OUTPATIENT
Start: 2020-02-27 | End: 2020-03-01

## 2020-02-27 RX ORDER — ALBUTEROL SULFATE 90 UG/1
2 AEROSOL, METERED RESPIRATORY (INHALATION) EVERY 4 HOURS PRN
Qty: 1 INHALER | Refills: 1 | Status: SHIPPED | OUTPATIENT
Start: 2020-02-27

## 2020-02-27 NOTE — PROGRESS NOTES
Subjective  Bigg Spear is a 69 y.o. female     History of Present Illness  Patient is a 69-year-old black female complaining of cough, congestion for the past 2 weeks.  She states over-the-counter medicines have not been helpful.  Denies fevers, non-smoker.  She especially feels wheezing in her chest at bedtime.    The following portions of the patient's history were reviewed and updated as appropriate: allergies, current medications, past family history, past medical history, past social history, past surgical history and problem list.    Review of Systems   Constitutional: Negative for fever.   HENT: Positive for congestion. Negative for ear pain, sinus pressure and sore throat.    Eyes: Negative for blurred vision, pain and redness.   Respiratory: Positive for cough and wheezing. Negative for shortness of breath.    Cardiovascular: Negative for chest pain.   Neurological: Positive for dizziness, light-headedness and headache.       Objective  Physical Exam   Constitutional: She is oriented to person, place, and time. She appears well-developed and well-nourished.   HENT:   Head: Normocephalic and atraumatic.   Right Ear: External ear normal.   Left Ear: External ear normal.   Nose: Nose normal.   Mouth/Throat: Oropharynx is clear and moist.   Eyes: Pupils are equal, round, and reactive to light. Conjunctivae and EOM are normal.   Neck: Normal range of motion. Neck supple.   Cardiovascular: Normal rate, regular rhythm and normal heart sounds. Frequent extrasystoles are present.   Pulmonary/Chest: Effort normal and breath sounds normal.   Neurological: She is alert and oriented to person, place, and time.   Psychiatric: She has a normal mood and affect. Her behavior is normal.       Vitals:    02/27/20 1127   BP: (!) 183/79   BP Location: Left arm   Patient Position: Sitting   Cuff Size: Adult   Pulse: 68   Temp: 97.8 °F (36.6 °C)   TempSrc: Oral   SpO2: 97%   Weight: 104 kg (230 lb)     Body mass index is  34.97 kg/m².    PHQ-9 Total Score:        Assessment/Plan  Diagnoses and all orders for this visit:    1. White coat syndrome with diagnosis of hypertension (Primary)  Comments:  Patient states her blood pressure this morning was 120/84, she is to monitor at home and call if consistently elevated.    2. Wheezing  Comments:  Prescription for albuterol inhaler sent in for wheezing.    3. Essential hypertension  Comments:  Discussed with patient that lisinopril may not be the best option based on her ethnicity, she wants to discuss further with Dr. Herrera.  She declined my offer to change her blood pressure medication.    4. Palpitations  Comments:  New, discussed with patient at length, offered beta-blocker which she declined.  She wants to discuss further with cardiology.  ER precautions given.    Other orders  -     albuterol sulfate  (90 Base) MCG/ACT inhaler; Inhale 2 puffs Every 4 (Four) Hours As Needed for Wheezing.  Dispense: 1 inhaler; Refill: 1  -     predniSONE (DELTASONE) 20 MG tablet; Take 1 tablet by mouth Daily for 3 days.  Dispense: 3 tablet; Refill: 0

## 2020-04-18 RX ORDER — AMLODIPINE BESYLATE 5 MG/1
TABLET ORAL
Qty: 90 TABLET | Refills: 0 | Status: SHIPPED | OUTPATIENT
Start: 2020-04-18 | End: 2020-07-15

## 2020-05-26 ENCOUNTER — OFFICE VISIT (OUTPATIENT)
Dept: FAMILY MEDICINE CLINIC | Facility: CLINIC | Age: 70
End: 2020-05-26

## 2020-05-26 ENCOUNTER — LAB (OUTPATIENT)
Dept: FAMILY MEDICINE CLINIC | Facility: CLINIC | Age: 70
End: 2020-05-26

## 2020-05-26 VITALS
SYSTOLIC BLOOD PRESSURE: 155 MMHG | TEMPERATURE: 99.3 F | BODY MASS INDEX: 31.52 KG/M2 | HEART RATE: 102 BPM | WEIGHT: 208 LBS | DIASTOLIC BLOOD PRESSURE: 91 MMHG | HEIGHT: 68 IN | OXYGEN SATURATION: 95 %

## 2020-05-26 DIAGNOSIS — E78.2 MIXED HYPERLIPIDEMIA: ICD-10-CM

## 2020-05-26 DIAGNOSIS — R63.4 ABNORMAL WEIGHT LOSS: ICD-10-CM

## 2020-05-26 DIAGNOSIS — E78.2 MIXED HYPERLIPIDEMIA: Primary | ICD-10-CM

## 2020-05-26 DIAGNOSIS — I10 ESSENTIAL HYPERTENSION: ICD-10-CM

## 2020-05-26 LAB
ALBUMIN SERPL-MCNC: 4.6 G/DL (ref 3.5–5.2)
ALBUMIN/GLOB SERPL: 1.4 G/DL
ALP SERPL-CCNC: 64 U/L (ref 39–117)
ALT SERPL W P-5'-P-CCNC: 15 U/L (ref 1–33)
ANION GAP SERPL CALCULATED.3IONS-SCNC: 12.4 MMOL/L (ref 5–15)
AST SERPL-CCNC: 19 U/L (ref 1–32)
BILIRUB SERPL-MCNC: 0.4 MG/DL (ref 0.2–1.2)
BUN BLD-MCNC: 14 MG/DL (ref 8–23)
BUN/CREAT SERPL: 16.1 (ref 7–25)
CALCIUM SPEC-SCNC: 9.8 MG/DL (ref 8.6–10.5)
CHLORIDE SERPL-SCNC: 100 MMOL/L (ref 98–107)
CHOLEST SERPL-MCNC: 310 MG/DL (ref 0–200)
CO2 SERPL-SCNC: 23.6 MMOL/L (ref 22–29)
CREAT BLD-MCNC: 0.87 MG/DL (ref 0.57–1)
GFR SERPL CREATININE-BSD FRML MDRD: 78 ML/MIN/1.73
GLOBULIN UR ELPH-MCNC: 3.2 GM/DL
GLUCOSE BLD-MCNC: 100 MG/DL (ref 65–99)
HDLC SERPL-MCNC: 77 MG/DL (ref 40–60)
LDLC SERPL CALC-MCNC: 217 MG/DL (ref 0–100)
LDLC/HDLC SERPL: 2.81 {RATIO}
POTASSIUM BLD-SCNC: 3.9 MMOL/L (ref 3.5–5.2)
PROT SERPL-MCNC: 7.8 G/DL (ref 6–8.5)
SODIUM BLD-SCNC: 136 MMOL/L (ref 136–145)
TRIGL SERPL-MCNC: 82 MG/DL (ref 0–150)
TSH SERPL DL<=0.05 MIU/L-ACNC: 1.87 UIU/ML (ref 0.27–4.2)
VLDLC SERPL-MCNC: 16.4 MG/DL (ref 5–40)

## 2020-05-26 PROCEDURE — 80053 COMPREHEN METABOLIC PANEL: CPT | Performed by: FAMILY MEDICINE

## 2020-05-26 PROCEDURE — 36415 COLL VENOUS BLD VENIPUNCTURE: CPT | Performed by: FAMILY MEDICINE

## 2020-05-26 PROCEDURE — 99213 OFFICE O/P EST LOW 20 MIN: CPT | Performed by: FAMILY MEDICINE

## 2020-05-26 PROCEDURE — 84443 ASSAY THYROID STIM HORMONE: CPT | Performed by: FAMILY MEDICINE

## 2020-05-26 PROCEDURE — 80061 LIPID PANEL: CPT | Performed by: FAMILY MEDICINE

## 2020-05-26 RX ORDER — METOPROLOL SUCCINATE 50 MG/1
50 TABLET, EXTENDED RELEASE ORAL DAILY
Qty: 90 TABLET | Refills: 3 | Status: SHIPPED | OUTPATIENT
Start: 2020-05-26 | End: 2020-06-03

## 2020-05-26 NOTE — PATIENT INSTRUCTIONS
Stop the lisinipril  Limit salt and caffeine  Keep working to lose weight through healthy eating and exercise.

## 2020-05-26 NOTE — PROGRESS NOTES
Subjective   Xavierse MABLE Spear is a 69 y.o. female.     She comes in for follow-up on her blood pressure as it has been elevated at home whereas normally it is only elevated here  She is currently on amlodipine and lisinopril  Headache and blurred vision  Has an appt to see her eye doctor next month  Misses her grandkids sec to the pandemic  She has dropped 22 pounds in the last few months  Eating less as she misses her children  Told by Zita at her last appointment that lisinopril is not always the best choice of medication in [place]  She is not sure if that affected her thinking and triggered a rise in her anxiety and elevation in her blood pressure  She says she is a worrier       The following portions of the patient's history were reviewed and updated as appropriate: allergies, current medications, past family history, past medical history, past social history, past surgical history and problem list.  Past Medical History:   Diagnosis Date   • Coronary artery disease due to lipid rich plaque 7/17/2019   • Coronary artery disease due to lipid rich plaque 7/17/2019   • Fibrocystic breast    • Hyperlipidemia    • Hypertension    • NSTEMI (non-ST elevated myocardial infarction) (CMS/[facility]) 04/27/2018     Past Surgical History:   Procedure Laterality Date   • CARDIAC CATHETERIZATION  04/28/2019    Mildly elevated left heart filling pressures, normal epicardial anatomy with no significant CAD     Family History   Problem Relation Age of Onset   • No Known Problems Mother    • No Known Problems Father    • No Known Problems Sister    • Colon cancer Brother    • No Known Problems Daughter    • No Known Problems Son    • No Known Problems Maternal Grandmother    • No Known Problems Paternal Grandmother    • No Known Problems Maternal Aunt    • No Known Problems Paternal Aunt      Social History     Socioeconomic History   • Marital status:      Spouse name: Not on file   • Number of children: Not on  "file   • Years of education: Not on file   • Highest education level: Not on file   Tobacco Use   • Smoking status: Never Smoker   • Smokeless tobacco: Never Used   Substance and Sexual Activity   • Alcohol use: No     Frequency: Never   • Drug use: No   • Sexual activity: Not Currently         Current Outpatient Medications:   •  amLODIPine (NORVASC) 5 MG tablet, Take 1 tablet by mouth once daily, Disp: 90 tablet, Rfl: 0  •  Garlic 1000 MG capsule, Take 1 tablet by mouth Daily., Disp: , Rfl:   •  Multiple Vitamins-Minerals (MULTIVITAMIN WITH MINERALS) tablet tablet, Take 1 tablet by mouth Daily., Disp: , Rfl:   •  Omega-3 Fatty Acids (EQL OMEGA 3 FISH OIL) 1400 MG capsule, Take 1 capsule by mouth 2 (Two) Times a Day., Disp: , Rfl:   •  albuterol sulfate  (90 Base) MCG/ACT inhaler, Inhale 2 puffs Every 4 (Four) Hours As Needed for Wheezing., Disp: 1 inhaler, Rfl: 1  •  metoprolol succinate XL (Toprol XL) 50 MG 24 hr tablet, Take 1 tablet by mouth Daily., Disp: 90 tablet, Rfl: 3    Review of Systems   Constitutional: Negative for diaphoresis, fatigue, fever, unexpected weight gain and unexpected weight loss.   Eyes: Positive for blurred vision.   Respiratory: Negative for cough, chest tightness and shortness of breath.    Cardiovascular: Negative for chest pain, palpitations and leg swelling.   Gastrointestinal: Negative for nausea and vomiting.   Neurological: Positive for headache. Negative for dizziness and syncope.     /91 (BP Location: Left arm, Patient Position: Sitting, Cuff Size: Large Adult)   Pulse 102   Temp 99.3 °F (37.4 °C) (Temporal)   Ht 172.7 cm (68\")   Wt 94.3 kg (208 lb)   SpO2 95%   Breastfeeding No   BMI 31.63 kg/m²       Objective   Physical Exam   Constitutional: She appears well-developed and well-nourished. No distress. She is obese.  HENT:   Head: Normocephalic and atraumatic.   Neck: Neck supple. No JVD present. No thyromegaly present.   Cardiovascular: Normal rate, " regular rhythm, normal heart sounds and intact distal pulses. Exam reveals no gallop and no friction rub.   No murmur heard.  Pulmonary/Chest: Effort normal and breath sounds normal. No respiratory distress. She has no wheezes. She has no rales.   Musculoskeletal: She exhibits no edema.   Lymphadenopathy:     She has no cervical adenopathy.   Neurological: She is alert.   Skin: Skin is warm and dry.   Nursing note and vitals reviewed.        Assessment/Plan   Problems Addressed this Visit        Cardiovascular and Mediastinum    Hyperlipidemia - Primary    Relevant Orders    Comprehensive Metabolic Panel    Lipid Panel    Hypertension    Relevant Medications    metoprolol succinate XL (Toprol XL) 50 MG 24 hr tablet    Other Relevant Orders    Comprehensive Metabolic Panel    Lipid Panel      Other Visit Diagnoses     Abnormal weight loss        Relevant Orders    TSH          Her blood pressures poorly controlled  I will have her continue the amlodipine but stop the lisinopril  I will start her on metoprolol XL 50 mg as she is having some problems with tachycardia  She denies recent edema or palpitations  She is still being noncompliant with her cholesterol medication  Is difficult to tell whether or not the change in her diet is secondary to missing her grandkids and the stress related to the pandemic or if there is something else going on  I am going to check her labs including a TSH  Counseled her on the need for weight loss as well as limiting caffeine and salt  I will see her back in a month

## 2020-05-27 ENCOUNTER — TELEPHONE (OUTPATIENT)
Dept: CARDIOLOGY | Facility: CLINIC | Age: 70
End: 2020-05-27

## 2020-05-27 NOTE — TELEPHONE ENCOUNTER
DR Quinn PT      PCP took her off Lisinopril 20 mg and added Metoprolol 50 mg.  Is that ok to take buecause DR Quinn did not address your irrg heart beat.

## 2020-05-27 NOTE — TELEPHONE ENCOUNTER
Pt notified that we could see her to talk about the palps or she could go ahead with the recommendations that her PCP made and we could play it by ear. She will go ahead with the changes and let us know how she does. Farheen

## 2020-05-28 NOTE — TELEPHONE ENCOUNTER
He is already overbooked and this is not urgent as she has already addressed her issue with a doctor. She can be seen sooner in [place] if she wishes to drive here. Farheen

## 2020-05-28 NOTE — TELEPHONE ENCOUNTER
Patient scheduled appt for 07/01/2020 with Dr. Quinn in [place]. Would like to know if she can be seen sooner in [place]? Is it okay to double book patient for a sooner appt?

## 2020-06-03 ENCOUNTER — TELEMEDICINE (OUTPATIENT)
Dept: CARDIOLOGY | Facility: CLINIC | Age: 70
End: 2020-06-03

## 2020-06-03 VITALS
HEART RATE: 65 BPM | BODY MASS INDEX: 31.52 KG/M2 | WEIGHT: 208 LBS | SYSTOLIC BLOOD PRESSURE: 142 MMHG | DIASTOLIC BLOOD PRESSURE: 76 MMHG | HEIGHT: 68 IN

## 2020-06-03 DIAGNOSIS — I25.10 CORONARY ARTERY DISEASE DUE TO LIPID RICH PLAQUE: Primary | ICD-10-CM

## 2020-06-03 DIAGNOSIS — I10 ESSENTIAL HYPERTENSION: ICD-10-CM

## 2020-06-03 DIAGNOSIS — I25.83 CORONARY ARTERY DISEASE DUE TO LIPID RICH PLAQUE: Primary | ICD-10-CM

## 2020-06-03 DIAGNOSIS — R00.2 PALPITATIONS: ICD-10-CM

## 2020-06-03 DIAGNOSIS — E78.2 MIXED HYPERLIPIDEMIA: ICD-10-CM

## 2020-06-03 PROCEDURE — 99214 OFFICE O/P EST MOD 30 MIN: CPT | Performed by: INTERNAL MEDICINE

## 2020-06-03 NOTE — PROGRESS NOTES
Subjective:     Encounter Date:06/03/2020      Patient ID: Bigg Spear is a 69 y.o. female.    Chief Complaint:  Chief Complaint   Patient presents with   • Follow-up       HPI:  69-year-old female patient with coronary artery risk factor significant for hypertension dyslipidemia who presented to [facility] with hypertensive emergency and a troponin elevation.  She underwent cardiac catheterization (4/27/2018: No more than 10 to 20% plaque with mildly elevated left ventricular end-diastolic filling pressures).    She returns today for routine follow-up.  I personally reviewed her most recent lipid profile Trg 78 HD 87  with and extremely favorable Trg/Hd ratio of ~0.9 indicating non-inflammatory state.  Amazingly, despite her severely elevated LDL her remaining lipid profile shows indicates cardioprotection; with a risk of developing death from coronary heart disease of less than 2% by standard classification.  Last visit we had a long discussion of eliminating carbohydrate rich foods from her diet and focusing on healthy fats and protein with cooked vegetables and fruits as an adjunctive.    The patient follow-up today via video tele-visit routinely with no complaints from a cardiovascular standpoint.  And had her antihypertensive regimen changed recently.  Given her diastolic dysfunction with elevated left heart filling pressures, she was on lisinopril as a used to prevent positive remodeling of the left ventricle (left ventricular hypertrophy).  We had a long discussion regarding antihypertensive regimens used for blood pressure control versus antihypertensives use for cardiac remodeling benefits.      The following portions of the patient's history were reviewed and updated as appropriate: allergies, current medications, past family history, past medical history, past social history, past surgical history and problem list.    Problem List:  Patient Active Problem List   Diagnosis   •  Hyperlipidemia   • Hypertension   • Coronary artery disease due to lipid rich plaque   • Anxiety disorder   • Carpal tunnel syndrome   • Encounter for general adult medical examination without abnormal findings   • History of prosthetic heart valve   • Obesity   • Encounter for screening for malignant neoplasm of colon   • Postmenopausal status   • Skin lesion   • Vitamin D deficiency   • White coat syndrome with diagnosis of hypertension   • Palpitations       Past Medical History:  Past Medical History:   Diagnosis Date   • Coronary artery disease due to lipid rich plaque 7/17/2019   • Coronary artery disease due to lipid rich plaque 7/17/2019   • Fibrocystic breast    • Hyperlipidemia    • Hypertension    • NSTEMI (non-ST elevated myocardial infarction) (CMS/[facility]) 04/27/2018       Past Surgical History:  Past Surgical History:   Procedure Laterality Date   • CARDIAC CATHETERIZATION  04/28/2019    Mildly elevated left heart filling pressures, normal epicardial anatomy with no significant CAD       Social History:  Social History     Socioeconomic History   • Marital status:      Spouse name: Not on file   • Number of children: Not on file   • Years of education: Not on file   • Highest education level: Not on file   Tobacco Use   • Smoking status: Never Smoker   • Smokeless tobacco: Never Used   Substance and Sexual Activity   • Alcohol use: No     Frequency: Never   • Drug use: No   • Sexual activity: Not Currently       Allergies:  Allergies   Allergen Reactions   • Dioxybenzone Shortness Of Breath   • Contrast Dye Arrhythmia   • Amoxicillin Palpitations         Review of Symptoms:  Constitutional: Patient afebrile no chills or unexpected weight changes  Respiratory: No cough, no wheezing or dyspnea  Cardiovascular: No chest pain, palpitations, dyspnea, orthopnea and no edema  Gastrointestinal: No nausea, vomiting, constipation or diarrhea.  No melena or dark stools    All other systems reviewed and are  "negative             Objective:         /76   Pulse 65   Ht 172.7 cm (68\")   Wt 94.3 kg (208 lb)   BMI 31.63 kg/m²     Physical exam performed during video visit  Constitutional: well-nourished, and appears stated age in no acute distress  PERRL: Conjunctiva clear, no pallor, anicteric  HENMT: normocephalic, normal dentition, no cyanosis or pallor  Neck: Normal jugular venous pressure  Cardiovascular: no lower or upper extremity edema.   Lungs: unlabored, no audible wheezing.  Extremities: no clubbing, cyanosis.    Abdomen: non-distended  Musculoskeletal: no joint swelling and no erythema  Skin: non-erythematous   Neuro:alert and normal affect. Oriented to time, place and person.         In-Office Procedure(s):  Procedures    ASCVD RIsk Score::  The 10-year ASCVD risk score (Jcarlos BARTON Jr., et al., 2013) is: 21.6%    Values used to calculate the score:      Age: 69 years      Sex: Female      Is Non- : Yes      Diabetic: No      Tobacco smoker: No      Systolic Blood Pressure: 142 mmHg      Is BP treated: Yes      HDL Cholesterol: 77 mg/dL      Total Cholesterol: 310 mg/dL    Recent Radiology:  Imaging Results (Most Recent)     None          Lab Review:   Lab on 05/26/2020   Component Date Value   • Total Cholesterol 05/26/2020 310*   • Triglycerides 05/26/2020 82    • HDL Cholesterol 05/26/2020 77*   • LDL Cholesterol  05/26/2020 217*   • VLDL Cholesterol 05/26/2020 16.4    • LDL/HDL Ratio 05/26/2020 2.81    Office Visit on 05/26/2020   Component Date Value   • Glucose 05/26/2020 100*   • BUN 05/26/2020 14    • Creatinine 05/26/2020 0.87    • Sodium 05/26/2020 136    • Potassium 05/26/2020 3.9    • Chloride 05/26/2020 100    • CO2 05/26/2020 23.6    • Calcium 05/26/2020 9.8    • Total Protein 05/26/2020 7.8    • Albumin 05/26/2020 4.60    • ALT (SGPT) 05/26/2020 15    • AST (SGOT) 05/26/2020 19    • Alkaline Phosphatase 05/26/2020 64    • Total Bilirubin 05/26/2020 0.4    • eGFR  " " Amer 05/26/2020 78    • Globulin 05/26/2020 3.2    • A/G Ratio 05/26/2020 1.4    • BUN/Creatinine Ratio 05/26/2020 16.1    • Anion Gap 05/26/2020 12.4    • TSH 05/26/2020 1.870               Invalid input(s): ALKPO4                        Invalid input(s): LDLCALC                Assessment:          Diagnosis Plan   1. Coronary artery disease due to lipid rich plaque     2. Mixed hyperlipidemia     3. Essential hypertension     4. Palpitations            Plan:         1. Coronary artery disease due to lipid rich plaque  Clinically silent and nonobstructive.    2. Mixed hyperlipidemia  Controlled on nutritional therapy    3. Essential hypertension  We will stop her beta-blocker given that she is on a moderate dose of amlodipine and is tolerating it well.  She is very symptomatic with her beta-blocker namely lightheadedness and \"feel out of my head\".    4. Palpitations  Anxiety related.    Greater than 26 minutes of face-to-face time was spent via video tele-conference with the patient and family with 10 minutes of charting time, of which greater than 50% was spent counseling specifically with regard to antihypertensive specifications.        Level of Care:                 Ed Quinn MD  06/03/20  .  "

## 2020-06-05 ENCOUNTER — TELEPHONE (OUTPATIENT)
Dept: CARDIOLOGY | Facility: CLINIC | Age: 70
End: 2020-06-05

## 2020-06-05 NOTE — TELEPHONE ENCOUNTER
BP for the 3 days;  6/3/2020: 130/82, HR 67  6/4/2020: 124/78, HR 69  6/5/2020: 142/82, HR 61    Pt advised to continue meds and monitoring BP. She reports that she was to be scheduled for a televisit for 6/10. I did not see anything scheduled so she was added. Farheen

## 2020-06-10 ENCOUNTER — TELEMEDICINE (OUTPATIENT)
Dept: CARDIOLOGY | Facility: CLINIC | Age: 70
End: 2020-06-10

## 2020-06-10 VITALS
DIASTOLIC BLOOD PRESSURE: 87 MMHG | HEART RATE: 87 BPM | HEIGHT: 68 IN | BODY MASS INDEX: 31.52 KG/M2 | WEIGHT: 208 LBS | SYSTOLIC BLOOD PRESSURE: 140 MMHG

## 2020-06-10 DIAGNOSIS — I25.83 CORONARY ARTERY DISEASE DUE TO LIPID RICH PLAQUE: Primary | ICD-10-CM

## 2020-06-10 DIAGNOSIS — E78.5 DYSLIPIDEMIA: ICD-10-CM

## 2020-06-10 DIAGNOSIS — I10 ESSENTIAL HYPERTENSION: ICD-10-CM

## 2020-06-10 DIAGNOSIS — F06.4 ANXIETY DISORDER DUE TO KNOWN PHYSIOLOGICAL CONDITION: ICD-10-CM

## 2020-06-10 DIAGNOSIS — I25.10 CORONARY ARTERY DISEASE DUE TO LIPID RICH PLAQUE: Primary | ICD-10-CM

## 2020-06-10 PROCEDURE — 99214 OFFICE O/P EST MOD 30 MIN: CPT | Performed by: INTERNAL MEDICINE

## 2020-06-10 NOTE — PROGRESS NOTES
Subjective:     Encounter Date:06/10/2020      Patient ID: Bigg Spear is a 69 y.o. female.    Chief Complaint:  Chief Complaint   Patient presents with   • Follow-up     HTN F/U       HPI:  69-year-old female patient with coronary artery risk factor significant for hypertension dyslipidemia who presented to [facility] with hypertensive emergency and a troponin elevation.  She underwent cardiac catheterization (4/27/2018: No more than 10 to 20% plaque with mildly elevated left ventricular end-diastolic filling pressures).    She returns today for routine follow-up.  I personally reviewed her most recent lipid profile Trg 78 HD 87  with and extremely favorable Trg/Hd ratio of ~0.9 indicating non-inflammatory state.  Amazingly, despite her severely elevated LDL her remaining lipid profile shows indicates cardioprotection; with a risk of developing death from coronary heart disease of less than 2% by standard classification.  Last visit we had a long discussion of eliminating carbohydrate rich foods from her diet and focusing on healthy fats and protein with cooked vegetables and fruits as an adjunctive.    Our last follow-up was via video tele-visit routinely. And had her antihypertensive regimen changed recently.  Given her diastolic dysfunction with elevated left heart filling pressures, she was on lisinopril as a used to prevent positive remodeling of the left ventricle (left ventricular hypertrophy).  We had a long discussion regarding antihypertensive regimens used for blood pressure control versus antihypertensives use for cardiac remodeling benefits.  We changed her to amlodipine only 5 mg.    The patient follow-up today via video tele-visit routinely with no complaints from a cardiovascular standpoint.  Her blood pressures been very well controlled on 5 mg of amlodipine only.  Of note she recently had laboratory data repeated which again shows an excellent profile with  non-atherogenic dyslipidemia and a triglyceride 2 HDL ratio of 1.0 and a total cholesterol to HDL ratio at 4.  Although her LDL is elevated given her inability to fractionate LDL via NMR, and her overall favorable ratios with a protective HDL, it makes no clinical sense to treat her LDL and it of itself.      The following portions of the patient's history were reviewed and updated as appropriate: allergies, current medications, past family history, past medical history, past social history, past surgical history and problem list.    Problem List:  Patient Active Problem List   Diagnosis   • Hyperlipidemia   • Hypertension   • Coronary artery disease due to lipid rich plaque   • Anxiety disorder   • Carpal tunnel syndrome   • Encounter for general adult medical examination without abnormal findings   • History of prosthetic heart valve   • Obesity   • Encounter for screening for malignant neoplasm of colon   • Postmenopausal status   • Skin lesion   • Vitamin D deficiency   • White coat syndrome with diagnosis of hypertension   • Palpitations   • Dyslipidemia       Past Medical History:  Past Medical History:   Diagnosis Date   • Coronary artery disease due to lipid rich plaque 7/17/2019   • Coronary artery disease due to lipid rich plaque 7/17/2019   • Dyslipidemia 6/10/2020   • Fibrocystic breast    • Hyperlipidemia    • Hypertension    • NSTEMI (non-ST elevated myocardial infarction) (CMS/[facility]) 04/27/2018       Past Surgical History:  Past Surgical History:   Procedure Laterality Date   • CARDIAC CATHETERIZATION  04/28/2019    Mildly elevated left heart filling pressures, normal epicardial anatomy with no significant CAD       Social History:  Social History     Socioeconomic History   • Marital status:      Spouse name: Not on file   • Number of children: Not on file   • Years of education: Not on file   • Highest education level: Not on file   Tobacco Use   • Smoking status: Never Smoker   • Smokeless  "tobacco: Never Used   Substance and Sexual Activity   • Alcohol use: No     Frequency: Never   • Drug use: No   • Sexual activity: Not Currently       Allergies:  Allergies   Allergen Reactions   • Dioxybenzone Shortness Of Breath   • Contrast Dye Arrhythmia   • Amoxicillin Palpitations         Review of Symptoms:  Constitutional: Patient afebrile no chills or unexpected weight changes  Respiratory: No cough, no wheezing or dyspnea  Cardiovascular: No chest pain, palpitations, dyspnea, orthopnea and no edema  Gastrointestinal: No nausea, vomiting, constipation or diarrhea.  No melena or dark stools    All other systems reviewed and are negative         Objective:         /87   Pulse 87   Ht 172.7 cm (68\")   Wt 94.3 kg (208 lb)   BMI 31.63 kg/m²     Physical exam performed during video visit  Constitutional: well-nourished, and appears stated age in no acute distress  PERRL: Conjunctiva clear, no pallor, anicteric  HENMT: normocephalic, normal dentition, no cyanosis or pallor  Neck: Normal jugular venous pressure  Cardiovascular: no lower or upper extremity edema.   Lungs: unlabored, no audible wheezing.  Extremities: no clubbing, cyanosis.    Abdomen: non-distended  Musculoskeletal: no joint swelling and no erythema  Skin: non-erythematous   Neuro:alert and normal affect. Oriented to time, place and person.       In-Office Procedure(s):  Procedures    ASCVD RIsk Score::  The 10-year ASCVD risk score (Jcarlos MICK Jr., et al., 2013) is: 21.1%    Values used to calculate the score:      Age: 69 years      Sex: Female      Is Non- : Yes      Diabetic: No      Tobacco smoker: No      Systolic Blood Pressure: 140 mmHg      Is BP treated: Yes      HDL Cholesterol: 77 mg/dL      Total Cholesterol: 310 mg/dL    Recent Radiology:  Imaging Results (Most Recent)     None          Lab Review:   Lab on 05/26/2020   Component Date Value   • Total Cholesterol 05/26/2020 310*   • Triglycerides " 05/26/2020 82    • HDL Cholesterol 05/26/2020 77*   • LDL Cholesterol  05/26/2020 217*   • VLDL Cholesterol 05/26/2020 16.4    • LDL/HDL Ratio 05/26/2020 2.81    Office Visit on 05/26/2020   Component Date Value   • Glucose 05/26/2020 100*   • BUN 05/26/2020 14    • Creatinine 05/26/2020 0.87    • Sodium 05/26/2020 136    • Potassium 05/26/2020 3.9    • Chloride 05/26/2020 100    • CO2 05/26/2020 23.6    • Calcium 05/26/2020 9.8    • Total Protein 05/26/2020 7.8    • Albumin 05/26/2020 4.60    • ALT (SGPT) 05/26/2020 15    • AST (SGOT) 05/26/2020 19    • Alkaline Phosphatase 05/26/2020 64    • Total Bilirubin 05/26/2020 0.4    • eGFR  African Amer 05/26/2020 78    • Globulin 05/26/2020 3.2    • A/G Ratio 05/26/2020 1.4    • BUN/Creatinine Ratio 05/26/2020 16.1    • Anion Gap 05/26/2020 12.4    • TSH 05/26/2020 1.870               Invalid input(s): ALKPO4                        Invalid input(s): LDLCALC                Assessment:          Diagnosis Plan   1. Coronary artery disease due to lipid rich plaque     2. Dyslipidemia     3. Essential hypertension     4. Anxiety disorder due to known physiological condition            Plan:         1. Coronary artery disease due to lipid rich plaque  No coronary disease by recent cardiac catheterization.  Continue dietary prevention    2.  Non-atherogenic dyslipidemia  Excellent lipid profile.    3. Essential hypertension  Well-controlled on medical therapy    4. Anxiety disorder due to known physiological condition  Stable    Greater than 25 minutes of face-to-face time was spent via video tele-conference with the patient and family with 10 minutes of charting time, of which greater than 50% was spent counseling specifically with regard to non-atherogenic dyslipidemia         Ed Quinn MD  06/10/20  .

## 2020-07-15 RX ORDER — AMLODIPINE BESYLATE 5 MG/1
TABLET ORAL
Qty: 90 TABLET | Refills: 3 | Status: SHIPPED | OUTPATIENT
Start: 2020-07-15 | End: 2021-08-02

## 2020-08-12 RX ORDER — LISINOPRIL 20 MG/1
TABLET ORAL
Qty: 90 TABLET | Refills: 0 | OUTPATIENT
Start: 2020-08-12

## 2020-09-16 ENCOUNTER — OFFICE VISIT (OUTPATIENT)
Dept: CARDIOLOGY | Facility: CLINIC | Age: 70
End: 2020-09-16

## 2020-09-16 VITALS
HEIGHT: 68 IN | RESPIRATION RATE: 18 BRPM | WEIGHT: 213.8 LBS | HEART RATE: 88 BPM | DIASTOLIC BLOOD PRESSURE: 90 MMHG | SYSTOLIC BLOOD PRESSURE: 190 MMHG | BODY MASS INDEX: 32.4 KG/M2

## 2020-09-16 DIAGNOSIS — I25.10 CORONARY ARTERY DISEASE DUE TO LIPID RICH PLAQUE: Primary | ICD-10-CM

## 2020-09-16 DIAGNOSIS — E78.2 MIXED HYPERLIPIDEMIA: ICD-10-CM

## 2020-09-16 DIAGNOSIS — I10 ESSENTIAL HYPERTENSION: ICD-10-CM

## 2020-09-16 DIAGNOSIS — I25.83 CORONARY ARTERY DISEASE DUE TO LIPID RICH PLAQUE: Primary | ICD-10-CM

## 2020-09-16 DIAGNOSIS — R00.2 PALPITATIONS: ICD-10-CM

## 2020-09-16 DIAGNOSIS — F06.4 ANXIETY DISORDER DUE TO KNOWN PHYSIOLOGICAL CONDITION: ICD-10-CM

## 2020-09-16 PROCEDURE — 99214 OFFICE O/P EST MOD 30 MIN: CPT | Performed by: INTERNAL MEDICINE

## 2020-09-16 NOTE — PROGRESS NOTES
Subjective:     Encounter Date:09/16/2020      Patient ID: Bigg Spear is a 69 y.o. female.    Chief Complaint:  Chief Complaint   Patient presents with   • Follow-up       HPI:  69-year-old female patient with coronary artery risk factor significant for hypertension dyslipidemia who presented to [facility] with hypertensive emergency and a troponin elevation.  She underwent cardiac catheterization (4/27/2018: No more than 10 to 20% plaque with mildly elevated left ventricular end-diastolic filling pressures).    She returns today for routine follow-up.  I personally reviewed her most recent lipid profile Trg 78 HD 87  with and extremely favorable Trg/Hd ratio of ~0.9 indicating non-inflammatory state.  Amazingly, despite her severely elevated LDL her remaining lipid profile shows indicates cardioprotection; with a risk of developing death from coronary heart disease of less than 2% by standard classification.  Last visit we had a long discussion of eliminating carbohydrate rich foods from her diet and focusing on healthy fats and protein with cooked vegetables and fruits as an adjunctive.    Our last follow-up was via video tele-visit routinely. And had her antihypertensive regimen changed recently.  Given her diastolic dysfunction with elevated left heart filling pressures, she was on lisinopril as a used to prevent positive remodeling of the left ventricle (left ventricular hypertrophy).  We had a long discussion regarding antihypertensive regimens used for blood pressure control versus antihypertensives use for cardiac remodeling benefits.  We changed her to amlodipine only 5 mg.    The patient follow-up today via video tele-visit routinely with no complaints from a cardiovascular standpoint.  Her blood pressures been very well controlled on 5 mg of amlodipine only.  Of note she recently had laboratory data repeated which again shows an excellent profile with non-atherogenic  dyslipidemia and a triglyceride 2 HDL ratio of 1.0 and a total cholesterol to HDL ratio at 4.  Although her LDL is elevated given her inability to fractionate LDL via NMR, and her overall favorable ratios with a protective HDL.    She has no complaints from a cardiovascular standpoint.    The following portions of the patient's history were reviewed and updated as appropriate: allergies, current medications, past family history, past medical history, past social history, past surgical history and problem list.    Problem List:  Patient Active Problem List   Diagnosis   • Hyperlipidemia   • Hypertension   • Coronary artery disease due to lipid rich plaque   • Anxiety disorder   • Carpal tunnel syndrome   • Encounter for general adult medical examination without abnormal findings   • History of prosthetic heart valve   • Obesity   • Encounter for screening for malignant neoplasm of colon   • Postmenopausal status   • Skin lesion   • Vitamin D deficiency   • White coat syndrome with diagnosis of hypertension   • Palpitations   • Dyslipidemia       Past Medical History:  Past Medical History:   Diagnosis Date   • Coronary artery disease due to lipid rich plaque 7/17/2019   • Coronary artery disease due to lipid rich plaque 7/17/2019   • Dyslipidemia 6/10/2020   • Fibrocystic breast    • Hyperlipidemia    • Hypertension    • NSTEMI (non-ST elevated myocardial infarction) (CMS/[facility]) 04/27/2018       Past Surgical History:  Past Surgical History:   Procedure Laterality Date   • CARDIAC CATHETERIZATION  04/28/2019    Mildly elevated left heart filling pressures, normal epicardial anatomy with no significant CAD       Social History:  Social History     Socioeconomic History   • Marital status:      Spouse name: Not on file   • Number of children: Not on file   • Years of education: Not on file   • Highest education level: Not on file   Tobacco Use   • Smoking status: Never Smoker   • Smokeless tobacco: Never Used  "  Substance and Sexual Activity   • Alcohol use: No     Frequency: Never   • Drug use: No   • Sexual activity: Not Currently       Allergies:  Allergies   Allergen Reactions   • Dioxybenzone Shortness Of Breath   • Contrast Dye Arrhythmia   • Amoxicillin Palpitations         Review of Symptoms:  Constitutional: Patient afebrile no chills or unexpected weight changes  Respiratory: No cough, no wheezing or dyspnea  Cardiovascular: Today the patient complains of no chest pain, palpitations, dyspnea, orthopnea and no edema  Gastrointestinal: No nausea, vomiting, constipation or diarrhea.  No melena or dark stools    All other systems reviewed and are negative             Objective:         BP (!) 190/90 (BP Location: Left arm, Patient Position: Sitting)   Pulse 88   Resp 18   Ht 172.7 cm (68\")   Wt 97 kg (213 lb 12.8 oz)   BMI 32.51 kg/m²     Physical exam  Constitutional: well-nourished, and appears stated age in no acute distress  PERRL: Conjunctiva clear, no pallor, anicteric  HENMT: normocephalic, normal dentition, no cyanosis or pallor  Neck:no bruits, or thrills and bilateral normal carotid upstroke. Normal jugular venous pressure  Cardiovascular: No parasternal heaves an non-displaced focal PMI. Normal rate and rhythm: no rub, gallop, 1 out of 6 systolic ejection murmur and normal S1 and S2; no lower or upper extremity edema.   Lungs: unlabored, no wheezing with no rales or rhonchi on auscultation.  Extremities: Warm, no clubbing, cyanosis. Full and equal peripheral pulses in extremities with no bruits appreciated.   Abdomen: soft, non-tender, non-distended  Musculoskeletal: no joint tenderness or swelling and no erythema  Skin: Warm and dry, non-erythematous   Neuro:alert and normal affect. Oriented to time, place and person.             In-Office Procedure(s):  Procedures    ASCVD RIsk Score::  The 10-year ASCVD risk score (Jcarlos MICK Jr., et al., 2013) is: 35%    Values used to calculate the score:      Age: " 69 years      Sex: Female      Is Non- : Yes      Diabetic: No      Tobacco smoker: No      Systolic Blood Pressure: 190 mmHg      Is BP treated: Yes      HDL Cholesterol: 77 mg/dL      Total Cholesterol: 310 mg/dL    Recent Radiology:  Imaging Results (Most Recent)     None          Lab Review:   No visits with results within 2 Month(s) from this visit.   Latest known visit with results is:   Lab on 05/26/2020   Component Date Value   • Total Cholesterol 05/26/2020 310*   • Triglycerides 05/26/2020 82    • HDL Cholesterol 05/26/2020 77*   • LDL Cholesterol  05/26/2020 217*   • VLDL Cholesterol 05/26/2020 16.4    • LDL/HDL Ratio 05/26/2020 2.81               Invalid input(s): ALKPO4                        Invalid input(s): LDLCALC                Assessment:          Diagnosis Plan   1. Coronary artery disease due to lipid rich plaque     2. Mixed hyperlipidemia     3. Essential hypertension     4. Palpitations     5. Anxiety disorder due to known physiological condition            Plan:         1. Coronary artery disease due to lipid rich plaque  Nonobstructive and clinically silent.    2. Mixed hyperlipidemia  Managing with nutritional therapy.  She has non-atherogenic dyslipidemia.    3. Essential hypertension  Well-controlled on medical therapy (monotherapy)    4. Palpitations  Resolved    5. Anxiety disorder due to known physiological condition  Stable                 Ed Quinn MD  09/16/20  .

## 2020-11-20 ENCOUNTER — OFFICE VISIT (OUTPATIENT)
Dept: FAMILY MEDICINE CLINIC | Facility: CLINIC | Age: 70
End: 2020-11-20

## 2020-11-20 VITALS
TEMPERATURE: 97.7 F | HEIGHT: 68 IN | DIASTOLIC BLOOD PRESSURE: 93 MMHG | HEART RATE: 83 BPM | SYSTOLIC BLOOD PRESSURE: 177 MMHG | BODY MASS INDEX: 32.58 KG/M2 | WEIGHT: 215 LBS | OXYGEN SATURATION: 100 %

## 2020-11-20 DIAGNOSIS — R10.11 RIGHT UPPER QUADRANT ABDOMINAL PAIN: ICD-10-CM

## 2020-11-20 DIAGNOSIS — R10.9 FLANK PAIN: Primary | ICD-10-CM

## 2020-11-20 LAB
BILIRUB BLD-MCNC: NEGATIVE MG/DL
CLARITY, POC: CLEAR
COLOR UR: YELLOW
GLUCOSE UR STRIP-MCNC: NEGATIVE MG/DL
KETONES UR QL: NEGATIVE
LEUKOCYTE EST, POC: NEGATIVE
NITRITE UR-MCNC: NEGATIVE MG/ML
PH UR: 5.5 [PH] (ref 5–8)
PROT UR STRIP-MCNC: NEGATIVE MG/DL
RBC # UR STRIP: NEGATIVE /UL
SP GR UR: 1 (ref 1–1.03)
UROBILINOGEN UR QL: NORMAL

## 2020-11-20 PROCEDURE — 99213 OFFICE O/P EST LOW 20 MIN: CPT | Performed by: FAMILY MEDICINE

## 2020-11-20 PROCEDURE — 81003 URINALYSIS AUTO W/O SCOPE: CPT | Performed by: FAMILY MEDICINE

## 2020-11-20 RX ORDER — CIPROFLOXACIN 500 MG/1
500 TABLET, FILM COATED ORAL 2 TIMES DAILY
Qty: 20 TABLET | Refills: 0 | Status: SHIPPED | OUTPATIENT
Start: 2020-11-20 | End: 2020-12-08

## 2020-11-20 NOTE — PROGRESS NOTES
Subjective   Bigg Spear is a 70 y.o. female.     Comes in with c/o flank pain on the right for a week as well as some constipation  For a few weeks  The last time she had this, her son told her to drink mag citrate and the constipation resolved as well as the flank pain  She eats greens tid   If she decreases this her bowels move well  Bowels move daily no matter what  FIT test 10/7/20 - neg  No nausea       The following portions of the patient's history were reviewed and updated as appropriate: allergies, current medications, past family history, past medical history, past social history, past surgical history and problem list.  Past Medical History:   Diagnosis Date   • Coronary artery disease due to lipid rich plaque 7/17/2019   • Coronary artery disease due to lipid rich plaque 7/17/2019   • Dyslipidemia 6/10/2020   • Fibrocystic breast    • Hyperlipidemia    • Hypertension    • NSTEMI (non-ST elevated myocardial infarction) (CMS/[facility]) 04/27/2018     Past Surgical History:   Procedure Laterality Date   • CARDIAC CATHETERIZATION  04/28/2019    Mildly elevated left heart filling pressures, normal epicardial anatomy with no significant CAD     Family History   Problem Relation Age of Onset   • No Known Problems Mother    • No Known Problems Father    • No Known Problems Sister    • Colon cancer Brother    • No Known Problems Daughter    • No Known Problems Son    • No Known Problems Maternal Grandmother    • No Known Problems Paternal Grandmother    • No Known Problems Maternal Aunt    • No Known Problems Paternal Aunt      Social History     Socioeconomic History   • Marital status:      Spouse name: Not on file   • Number of children: Not on file   • Years of education: Not on file   • Highest education level: Not on file   Tobacco Use   • Smoking status: Never Smoker   • Smokeless tobacco: Never Used   Substance and Sexual Activity   • Alcohol use: No     Frequency: Never   • Drug use: No   • Sexual  "activity: Not Currently         Current Outpatient Medications:   •  albuterol sulfate  (90 Base) MCG/ACT inhaler, Inhale 2 puffs Every 4 (Four) Hours As Needed for Wheezing., Disp: 1 inhaler, Rfl: 1  •  amLODIPine (NORVASC) 5 MG tablet, Take 1 tablet by mouth once daily, Disp: 90 tablet, Rfl: 3  •  ciprofloxacin (Cipro) 500 MG tablet, Take 1 tablet by mouth 2 (Two) Times a Day., Disp: 20 tablet, Rfl: 0  •  Garlic 1000 MG capsule, Take 1 tablet by mouth Daily., Disp: , Rfl:   •  Multiple Vitamins-Minerals (MULTIVITAMIN WITH MINERALS) tablet tablet, Take 1 tablet by mouth Daily., Disp: , Rfl:   •  Omega-3 Fatty Acids (EQL OMEGA 3 FISH OIL) 1400 MG capsule, Take 1 capsule by mouth 2 (Two) Times a Day., Disp: , Rfl:     Review of Systems   Constitutional: Negative.    Respiratory: Negative.    Cardiovascular: Negative.    Gastrointestinal: Positive for abdominal pain. Negative for blood in stool, constipation, diarrhea, nausea and vomiting.   Genitourinary: Positive for flank pain. Negative for dysuria, frequency, pelvic pain and urgency.   Musculoskeletal: Positive for back pain.   Skin: Negative for rash.   Neurological: Negative for dizziness, light-headedness and headache.     /93 (BP Location: Left arm, Patient Position: Sitting, Cuff Size: Large Adult)   Pulse 83   Temp 97.7 °F (36.5 °C) (Temporal)   Ht 172.7 cm (68\")   Wt 97.5 kg (215 lb)   SpO2 100%   Breastfeeding No   BMI 32.69 kg/m²       Objective   Physical Exam  Constitutional:       Appearance: Normal appearance.   HENT:      Head: Normocephalic and atraumatic.   Neck:      Musculoskeletal: Neck supple.   Cardiovascular:      Rate and Rhythm: Normal rate and regular rhythm.      Heart sounds: Normal heart sounds.   Pulmonary:      Effort: Pulmonary effort is normal.      Breath sounds: Normal breath sounds.   Abdominal:      Palpations: There is no mass.      Tenderness: There is abdominal tenderness in the right upper quadrant. There " is no right CVA tenderness, left CVA tenderness, guarding or rebound. Negative signs include Nelson's sign.      Hernia: No hernia is present.   Musculoskeletal:      Right lower leg: No edema.      Left lower leg: No edema.      Comments: No vertebral tenderness.  Tender just below the right CVA   Skin:     General: Skin is warm and dry.      Findings: No rash.   Neurological:      Mental Status: She is alert.      Motor: Motor function is intact.      Deep Tendon Reflexes: Reflexes are normal and symmetric.       Brief Urine Lab Results  (Last result in the past 365 days)      Color   Clarity   Blood   Leuk Est   Nitrite   Protein   CREAT   Urine HCG        11/20/20 1257 Yellow Clear Negative Negative Negative Negative                 Assessment/Plan   Problems Addressed this Visit     None      Visit Diagnoses     Flank pain    -  Primary    Relevant Orders    POCT urinalysis dipstick, automated (Completed)    Right upper quadrant abdominal pain          Diagnoses       Codes Comments    Flank pain    -  Primary ICD-10-CM: R10.9  ICD-9-CM: 789.09     Right upper quadrant abdominal pain     ICD-10-CM: R10.11  ICD-9-CM: 789.01         She has a normal bowel movement every day and does not have to work to have a bm  Rec she decrease her greens consumption and increase her fluids  Urine is normal so I do not feel she has a uti or pyelo  Cholecystitis vs diverticulitis vs muscle spasm (not a complete list)  Will start her on cipro  Gave her strict instructions if her symptoms increase is severity she needs to be seen

## 2020-11-20 NOTE — PATIENT INSTRUCTIONS
Increase fluids  Decrease the greens a little  Stay active  If pain increases in severity let me know  If you develop fever or vomiting, go to the ER

## 2020-12-08 ENCOUNTER — LAB (OUTPATIENT)
Dept: FAMILY MEDICINE CLINIC | Facility: CLINIC | Age: 70
End: 2020-12-08

## 2020-12-08 ENCOUNTER — OFFICE VISIT (OUTPATIENT)
Dept: FAMILY MEDICINE CLINIC | Facility: CLINIC | Age: 70
End: 2020-12-08

## 2020-12-08 VITALS
HEIGHT: 68 IN | TEMPERATURE: 96.6 F | BODY MASS INDEX: 31.22 KG/M2 | SYSTOLIC BLOOD PRESSURE: 188 MMHG | HEART RATE: 89 BPM | OXYGEN SATURATION: 99 % | DIASTOLIC BLOOD PRESSURE: 93 MMHG | WEIGHT: 206 LBS

## 2020-12-08 DIAGNOSIS — M67.441 GANGLION CYST OF FLEXOR TENDON SHEATH OF FINGER OF RIGHT HAND: ICD-10-CM

## 2020-12-08 DIAGNOSIS — I10 ESSENTIAL HYPERTENSION: ICD-10-CM

## 2020-12-08 DIAGNOSIS — E78.2 MIXED HYPERLIPIDEMIA: ICD-10-CM

## 2020-12-08 DIAGNOSIS — Z11.59 NEED FOR HEPATITIS C SCREENING TEST: ICD-10-CM

## 2020-12-08 DIAGNOSIS — K59.00 CONSTIPATION, UNSPECIFIED CONSTIPATION TYPE: Primary | ICD-10-CM

## 2020-12-08 DIAGNOSIS — R20.2 PARESTHESIAS: ICD-10-CM

## 2020-12-08 DIAGNOSIS — E78.2 MIXED HYPERLIPIDEMIA: Primary | ICD-10-CM

## 2020-12-08 LAB
ALBUMIN SERPL-MCNC: 4.5 G/DL (ref 3.5–5.2)
ALBUMIN/GLOB SERPL: 1.4 G/DL
ALP SERPL-CCNC: 78 U/L (ref 39–117)
ALT SERPL W P-5'-P-CCNC: 21 U/L (ref 1–33)
ANION GAP SERPL CALCULATED.3IONS-SCNC: 11.1 MMOL/L (ref 5–15)
AST SERPL-CCNC: 23 U/L (ref 1–32)
BILIRUB SERPL-MCNC: 0.4 MG/DL (ref 0–1.2)
BUN SERPL-MCNC: 13 MG/DL (ref 8–23)
BUN/CREAT SERPL: 17.1 (ref 7–25)
CALCIUM SPEC-SCNC: 9.6 MG/DL (ref 8.6–10.5)
CHLORIDE SERPL-SCNC: 102 MMOL/L (ref 98–107)
CHOLEST SERPL-MCNC: 357 MG/DL (ref 0–200)
CO2 SERPL-SCNC: 26.9 MMOL/L (ref 22–29)
CREAT SERPL-MCNC: 0.76 MG/DL (ref 0.57–1)
GFR SERPL CREATININE-BSD FRML MDRD: 91 ML/MIN/1.73
GLOBULIN UR ELPH-MCNC: 3.3 GM/DL
GLUCOSE SERPL-MCNC: 87 MG/DL (ref 65–99)
HCV AB SER DONR QL: NORMAL
HDLC SERPL-MCNC: 86 MG/DL (ref 40–60)
LDLC SERPL CALC-MCNC: 261 MG/DL (ref 0–100)
LDLC/HDLC SERPL: 2.98 {RATIO}
POTASSIUM SERPL-SCNC: 4.7 MMOL/L (ref 3.5–5.2)
PROT SERPL-MCNC: 7.8 G/DL (ref 6–8.5)
SODIUM SERPL-SCNC: 140 MMOL/L (ref 136–145)
TRIGL SERPL-MCNC: 75 MG/DL (ref 0–150)
VLDLC SERPL-MCNC: 10 MG/DL (ref 5–40)

## 2020-12-08 PROCEDURE — 36415 COLL VENOUS BLD VENIPUNCTURE: CPT | Performed by: FAMILY MEDICINE

## 2020-12-08 PROCEDURE — 80053 COMPREHEN METABOLIC PANEL: CPT | Performed by: FAMILY MEDICINE

## 2020-12-08 PROCEDURE — 86803 HEPATITIS C AB TEST: CPT | Performed by: FAMILY MEDICINE

## 2020-12-08 PROCEDURE — 99214 OFFICE O/P EST MOD 30 MIN: CPT | Performed by: FAMILY MEDICINE

## 2020-12-08 PROCEDURE — 80061 LIPID PANEL: CPT | Performed by: FAMILY MEDICINE

## 2020-12-08 NOTE — PROGRESS NOTES
Subjective   Bigg Spear is a 70 y.o. female.     Here for follow up on bp and chol  Still constipated and is uncomfortable but no further flank pain  She has not had a colonoscopy for over 10years  She refuses vaccines  She does not need refills  bp at home consistently runs in the 120's systolic       The following portions of the patient's history were reviewed and updated as appropriate: allergies, current medications, past family history, past medical history, past social history, past surgical history and problem list.  Past Medical History:   Diagnosis Date   • Coronary artery disease due to lipid rich plaque 7/17/2019   • Coronary artery disease due to lipid rich plaque 7/17/2019   • Dyslipidemia 6/10/2020   • Fibrocystic breast    • Hyperlipidemia    • Hypertension    • NSTEMI (non-ST elevated myocardial infarction) (CMS/[facility]) 04/27/2018     Past Surgical History:   Procedure Laterality Date   • CARDIAC CATHETERIZATION  04/28/2019    Mildly elevated left heart filling pressures, normal epicardial anatomy with no significant CAD     Family History   Problem Relation Age of Onset   • No Known Problems Mother    • No Known Problems Father    • No Known Problems Sister    • Colon cancer Brother    • No Known Problems Daughter    • No Known Problems Son    • No Known Problems Maternal Grandmother    • No Known Problems Paternal Grandmother    • No Known Problems Maternal Aunt    • No Known Problems Paternal Aunt      Social History     Socioeconomic History   • Marital status:      Spouse name: Not on file   • Number of children: Not on file   • Years of education: Not on file   • Highest education level: Not on file   Tobacco Use   • Smoking status: Never Smoker   • Smokeless tobacco: Never Used   Substance and Sexual Activity   • Alcohol use: No     Frequency: Never   • Drug use: No   • Sexual activity: Not Currently         Current Outpatient Medications:   •  albuterol sulfate  (90 Base)  "MCG/ACT inhaler, Inhale 2 puffs Every 4 (Four) Hours As Needed for Wheezing., Disp: 1 inhaler, Rfl: 1  •  amLODIPine (NORVASC) 5 MG tablet, Take 1 tablet by mouth once daily, Disp: 90 tablet, Rfl: 3  •  Garlic 1000 MG capsule, Take 1 tablet by mouth Daily., Disp: , Rfl:   •  Multiple Vitamins-Minerals (MULTIVITAMIN WITH MINERALS) tablet tablet, Take 1 tablet by mouth Daily., Disp: , Rfl:   •  Omega-3 Fatty Acids (EQL OMEGA 3 FISH OIL) 1400 MG capsule, Take 1 capsule by mouth 2 (Two) Times a Day., Disp: , Rfl:     Review of Systems   Constitutional: Negative for diaphoresis, fatigue, fever, unexpected weight gain and unexpected weight loss.   Respiratory: Negative for cough, chest tightness and shortness of breath.    Cardiovascular: Negative for chest pain, palpitations and leg swelling.   Gastrointestinal: Positive for constipation. Negative for abdominal pain, nausea and vomiting.   Neurological: Positive for numbness. Negative for dizziness, syncope and headache.     BP (!) 188/93 (BP Location: Left arm, Patient Position: Sitting, Cuff Size: Adult)   Pulse 89   Temp 96.6 °F (35.9 °C) (Temporal)   Ht 172.7 cm (68\")   Wt 93.4 kg (206 lb)   SpO2 99%   Breastfeeding No   BMI 31.32 kg/m²       Objective   Physical Exam  Vitals signs and nursing note reviewed.   Constitutional:       General: She is not in acute distress.     Appearance: Normal appearance. She is well-developed. She is obese.   HENT:      Head: Normocephalic and atraumatic.   Neck:      Musculoskeletal: Neck supple.      Thyroid: No thyromegaly.      Vascular: No JVD.   Cardiovascular:      Rate and Rhythm: Normal rate and regular rhythm.      Heart sounds: Normal heart sounds. No murmur. No friction rub. No gallop.    Pulmonary:      Effort: Pulmonary effort is normal. No respiratory distress.      Breath sounds: Normal breath sounds. No wheezing or rales.   Abdominal:      General: Abdomen is flat. Bowel sounds are normal.      Palpations: " Abdomen is soft.      Tenderness: There is no abdominal tenderness.   Musculoskeletal:      Comments: Palpable nodule on the palmar aspect of the right 4th distal metacarpal   Lymphadenopathy:      Cervical: No cervical adenopathy.   Skin:     General: Skin is warm and dry.   Neurological:      Mental Status: She is alert.      Comments:  strength is normal             Assessment/Plan   Problems Addressed this Visit        Cardiovascular and Mediastinum    Hyperlipidemia    Hypertension      Other Visit Diagnoses     Constipation, unspecified constipation type    -  Primary    Relevant Orders    Ambulatory Referral to Gastroenterology    Ganglion cyst of flexor tendon sheath of finger of right hand        Paresthesias          Diagnoses       Codes Comments    Constipation, unspecified constipation type    -  Primary ICD-10-CM: K59.00  ICD-9-CM: 564.00     Mixed hyperlipidemia     ICD-10-CM: E78.2  ICD-9-CM: 272.2     Essential hypertension     ICD-10-CM: I10  ICD-9-CM: 401.9     Ganglion cyst of flexor tendon sheath of finger of right hand     ICD-10-CM: M67.441  ICD-9-CM: 727.42     Paresthesias     ICD-10-CM: R20.2  ICD-9-CM: 782.0         bp is well controlled at home  Appears to have a component of white coat hypertension as well  She will continue current meds  Will see her back in 6 mo for follow up and medicare wellness  Counseled on weight loss  Gave her samples of miralax to try for constipation and referred to GI  She refuses vaccines  Reassured regarding the ganglion cyst and paresthesias

## 2021-02-15 ENCOUNTER — LAB (OUTPATIENT)
Dept: LAB | Facility: HOSPITAL | Age: 71
End: 2021-02-15

## 2021-02-15 LAB — SARS-COV-2 ORF1AB RESP QL NAA+PROBE: NOT DETECTED

## 2021-02-15 PROCEDURE — U0004 COV-19 TEST NON-CDC HGH THRU: HCPCS

## 2021-02-15 PROCEDURE — C9803 HOPD COVID-19 SPEC COLLECT: HCPCS

## 2021-02-17 ENCOUNTER — ANESTHESIA EVENT (OUTPATIENT)
Dept: GASTROENTEROLOGY | Facility: HOSPITAL | Age: 71
End: 2021-02-17

## 2021-02-17 ENCOUNTER — ON CAMPUS - OUTPATIENT (AMBULATORY)
Dept: URBAN - METROPOLITAN AREA HOSPITAL 85 | Facility: HOSPITAL | Age: 71
End: 2021-02-17
Payer: MEDICAID

## 2021-02-17 ENCOUNTER — ANESTHESIA (OUTPATIENT)
Dept: GASTROENTEROLOGY | Facility: HOSPITAL | Age: 71
End: 2021-02-17

## 2021-02-17 ENCOUNTER — HOSPITAL ENCOUNTER (OUTPATIENT)
Facility: HOSPITAL | Age: 71
Setting detail: HOSPITAL OUTPATIENT SURGERY
Discharge: HOME | End: 2021-02-17
Attending: INTERNAL MEDICINE | Admitting: INTERNAL MEDICINE

## 2021-02-17 VITALS
HEART RATE: 62 BPM | RESPIRATION RATE: 10 BRPM | DIASTOLIC BLOOD PRESSURE: 81 MMHG | OXYGEN SATURATION: 100 % | SYSTOLIC BLOOD PRESSURE: 161 MMHG | HEIGHT: 68 IN | BODY MASS INDEX: 31.22 KG/M2 | WEIGHT: 206 LBS

## 2021-02-17 DIAGNOSIS — K63.89 OTHER SPECIFIED DISEASES OF INTESTINE: ICD-10-CM

## 2021-02-17 DIAGNOSIS — K63.5 POLYP OF COLON: ICD-10-CM

## 2021-02-17 DIAGNOSIS — K59.00 CONSTIPATION: ICD-10-CM

## 2021-02-17 DIAGNOSIS — Z12.11 ENCOUNTER FOR SCREENING FOR MALIGNANT NEOPLASM OF COLON: ICD-10-CM

## 2021-02-17 DIAGNOSIS — R19.4 CHANGE IN BOWEL HABIT: ICD-10-CM

## 2021-02-17 DIAGNOSIS — K64.2 THIRD DEGREE HEMORRHOIDS: ICD-10-CM

## 2021-02-17 DIAGNOSIS — R10.9 ABDOMINAL PAIN: ICD-10-CM

## 2021-02-17 DIAGNOSIS — Z80.0 FAMILY HISTORY OF COLON CANCER: ICD-10-CM

## 2021-02-17 PROCEDURE — 88305 TISSUE EXAM BY PATHOLOGIST: CPT | Performed by: INTERNAL MEDICINE

## 2021-02-17 PROCEDURE — 45385 COLONOSCOPY W/LESION REMOVAL: CPT | Mod: PT | Performed by: INTERNAL MEDICINE

## 2021-02-17 PROCEDURE — 25010000002 PROPOFOL 10 MG/ML EMULSION: Performed by: NURSE ANESTHETIST, CERTIFIED REGISTERED

## 2021-02-17 RX ORDER — PROPOFOL 10 MG/ML
VIAL (ML) INTRAVENOUS AS NEEDED
Status: DISCONTINUED | OUTPATIENT
Start: 2021-02-17 | End: 2021-02-17 | Stop reason: SURG

## 2021-02-17 RX ORDER — SODIUM CHLORIDE 9 MG/ML
9 INJECTION, SOLUTION INTRAVENOUS CONTINUOUS
Status: DISCONTINUED | OUTPATIENT
Start: 2021-02-17 | End: 2021-02-17 | Stop reason: HOSPADM

## 2021-02-17 RX ORDER — ONDANSETRON 2 MG/ML
4 INJECTION INTRAMUSCULAR; INTRAVENOUS ONCE AS NEEDED
Status: DISCONTINUED | OUTPATIENT
Start: 2021-02-17 | End: 2021-02-17 | Stop reason: HOSPADM

## 2021-02-17 RX ORDER — LIDOCAINE HYDROCHLORIDE 10 MG/ML
INJECTION, SOLUTION EPIDURAL; INFILTRATION; INTRACAUDAL; PERINEURAL AS NEEDED
Status: DISCONTINUED | OUTPATIENT
Start: 2021-02-17 | End: 2021-02-17 | Stop reason: SURG

## 2021-02-17 RX ADMIN — PROPOFOL 20 MG: 10 INJECTION, EMULSION INTRAVENOUS at 11:00

## 2021-02-17 RX ADMIN — PROPOFOL 20 MG: 10 INJECTION, EMULSION INTRAVENOUS at 11:02

## 2021-02-17 RX ADMIN — PROPOFOL 80 MG: 10 INJECTION, EMULSION INTRAVENOUS at 10:55

## 2021-02-17 RX ADMIN — LIDOCAINE HYDROCHLORIDE 100 MG: 10 INJECTION, SOLUTION EPIDURAL; INFILTRATION; INTRACAUDAL; PERINEURAL at 10:55

## 2021-02-17 RX ADMIN — PROPOFOL 20 MG: 10 INJECTION, EMULSION INTRAVENOUS at 11:08

## 2021-02-17 RX ADMIN — SODIUM CHLORIDE 9 ML/HR: 0.9 INJECTION, SOLUTION INTRAVENOUS at 09:29

## 2021-02-17 RX ADMIN — PROPOFOL 20 MG: 10 INJECTION, EMULSION INTRAVENOUS at 10:58

## 2021-02-17 RX ADMIN — PROPOFOL 20 MG: 10 INJECTION, EMULSION INTRAVENOUS at 11:04

## 2021-02-17 RX ADMIN — SODIUM CHLORIDE: 0.9 INJECTION, SOLUTION INTRAVENOUS at 10:50

## 2021-02-17 RX ADMIN — PROPOFOL 20 MG: 10 INJECTION, EMULSION INTRAVENOUS at 11:06

## 2021-02-17 NOTE — H&P
" LOS: 0 days   Patient Care Team:  Stephanie Morris MD as PCP - General (Family Medicine)  Ed Quinn MD as Consulting Physician (Cardiology)  Jess Baez OD (Optometry)      Subjective     Interval History:     Subjective: Screening colonoscopy       ROS:   No chest pain, shortness of breath, or cough. No melena or hematochezia         Medication Review:     Current Facility-Administered Medications:   •  sodium chloride 0.9 % infusion, 9 mL/hr, Intravenous, Continuous, Bang Santamaria MD, Last Rate: 9 mL/hr at 02/17/21 0929, 9 mL/hr at 02/17/21 0929      Objective     Vital Signs  Vitals:    02/11/21 1047   Weight: 93.4 kg (206 lb)   Height: 171.5 cm (67.5\")       Physical Exam:    General Appearance:    Awake and alert, in no acute distress   Head:    Normocephalic, without obvious abnormality   Eyes:          Conjunctivae normal, anicteric sclera   Throat:   No oral lesions, no thrush, oral mucosa moist   Neck:   No adenopathy, supple, no JVD   CV/Lungs:     RRR, respirations regular, even and unlabored   Abdomen:     Soft, non-tender, no rebound or guarding, non-distended, no hepatosplenomegaly   Rectal:     Deferred   Extremities:   No edema, no cyanosis   Skin:   No bruising or rash, no jaundice        Results Review:    Lab Results (last 24 hours)     ** No results found for the last 24 hours. **          Imaging Results (Last 24 Hours)     ** No results found for the last 24 hours. **            Assessment/Plan   Proceed with scope and MAC anesthesia    Proceed with colonoscopy    Bang Santamaria MD  02/17/21  09:54 EST  "

## 2021-02-17 NOTE — ANESTHESIA POSTPROCEDURE EVALUATION
Patient: Bigg Spear    Procedure Summary     Date: 02/17/21 Room / Location: [facility] ENDOSCOPY 1 / [facility] ENDOSCOPY    Anesthesia Start: 1050 Anesthesia Stop: 1118    Procedure: COLONOSCOPY WITH POLYPECTOMY X7 (N/A ) Diagnosis:       Change in bowel habit      Constipation      Abdominal pain      Family history of colon cancer      (Change in bowel habit [R19.4])      (Constipation [K59.00])      (Abdominal pain [R10.9])      (Family history of colon cancer [Z80.0])    Surgeon: Bang Santamaria MD Provider: Geovanni hSarma MD    Anesthesia Type: MAC ASA Status: 3          Anesthesia Type: MAC    Vitals  Vitals Value Taken Time   /81 02/17/21 1141   Temp     Pulse 62 02/17/21 1143   Resp 10 02/17/21 1141   SpO2 100 % 02/17/21 1143   Vitals shown include unvalidated device data.        Post Anesthesia Care and Evaluation    Patient location during evaluation: PACU  Patient participation: complete - patient participated  Level of consciousness: awake  Pain scale: See nurse's notes for pain score.  Pain management: adequate  Airway patency: patent  Anesthetic complications: No anesthetic complications  PONV Status: none  Cardiovascular status: acceptable  Respiratory status: acceptable  Hydration status: acceptable    Comments: Patient seen and examined postoperatively; vital signs stable; SpO2 greater than or equal to 90%; cardiopulmonary status stable; nausea/vomiting adequately controlled; pain adequately controlled; no apparent anesthesia complications; patient discharged from anesthesia care when discharge criteria were met

## 2021-02-17 NOTE — ANESTHESIA PREPROCEDURE EVALUATION
Anesthesia Evaluation     Patient summary reviewed and Nursing notes reviewed   history of anesthetic complications:  NPO Solid Status: > 8 hours  NPO Liquid Status: > 8 hours           Airway   Dental      Pulmonary    Cardiovascular     (+) hypertension, past MI , CAD, hyperlipidemia,       Neuro/Psych  (+) psychiatric history Anxiety,     GI/Hepatic/Renal/Endo    (+) obesity,       Musculoskeletal     Abdominal    Substance History      OB/GYN          Other        ROS/Med Hx Other: abd pain, CTS, palpitations, skin lesion, white coat syndrome, borderline DM, low vit D    H/O of anesthesia overdose    PSH  CARDIAC CATHETERIZATION D&C AND LAPAROSCOPY   Valve replacement                  Anesthesia Plan    ASA 3     MAC   (Patient identified; pre-operative vital signs, all relevant labs/studies, complete medical/surgical/anesthetic history, full medication list, full allergy list, and NPO status obtained/reviewed; physical assessment performed; anesthetic options, side effects, potential complications, risks, and benefits discussed; questions answered; written anesthesia consent obtained; patient cleared for procedure; anesthesia machine and equipment checked and functioning)    Anesthetic plan, all risks, benefits, and alternatives have been provided, discussed and informed consent has been obtained with: patient.    Plan discussed with CRNA and CAA.

## 2021-02-17 NOTE — OP NOTE
COLONOSCOPY  Procedure Report    Patient Name:  Bigg Spear  YOB: 1950    Date of Surgery:  2/17/2021     Indications: Screening colonoscopy    Pre-op Diagnosis:   Change in bowel habit [R19.4]  Constipation [K59.00]  Abdominal pain [R10.9]  Family history of colon cancer [Z80.0]         Post-op Diagnosis:  Melanosis coli  Colonoscopy with polypectomy x7      Procedure(s):  COLONOSCOPY WITH POLYPECTOMY X7    Staff:  Surgeon(s):  Bnag Santamaria MD         Anesthesia: Monitored Anesthesia Care    Estimated Blood Loss: None  Implants:    Nothing was implanted during the procedure    Specimen:          7 descending colon polyps    Complications:  None    Description of Procedure:  Informed consent was obtained from the patient.  They were placed in the left lateral decubitus position and IV conscious sedation was administered by anesthesia while being monitored continuously throughout the procedure.  Digital rectal examination revealed no external hemorrhoid fissure or fistula digital exam of the anal canal rectal vault revealed moderate internal hemorrhoids with no other abnormalities identified.  There were no fissure or fistula.  The video Olympus colonoscope was introduced in the rectum and advanced to the cecum where the ileocecal valve and appendiceal orifice were identified and photographed.  The prep was excellent.  On slow withdrawal close circumferential colonic mucosal inspection was performed.  There is diffuse mild to moderate melanosis coli.  No significant diverticulosis was noted.  In the descending colon extending to the sigmoid there are 7 polyps ranging in size from 2 to 5 mm.  Most were cold snared the small 2 mm polyp was cold biopsied.  Retroflexion showed moderate grade 3 internal hemorrhoids the scope was removed she tolerated the procedure well returned to recovery good condition.    Impression:  Melanosis coli  Colon polyps    Recommendations:  High-fiber  diet  Benefiber 1 to 2 tablespoons daily  Call in 3 days for biopsies  Call immediately for any postop problems  If 3 or more polyps are adenomas repeat colonoscopy in 3 years is recommended and if 2 or fewer are adenomas, repeat in 5 years is  recommended        Bang Santamaria MD     Date: 2/17/2021  Time: 11:14 EST

## 2021-02-17 NOTE — DISCHARGE INSTRUCTIONS
A responsible adult should stay with you and you should rest quietly for the rest of the day.    Do not drink alcohol, drive, operate any heavy machinery or power tools or make any legal/important decisions for the next 24 hours.     Progress your diet as tolerated.  If you begin to experience severe pain, increased shortness of breath, racing heartbeat or a fever above 101 F, seek immediate medical attention.     Follow up with MD as instructed. Call office for results in 3 to 5 days if needed.    High-fiber diet  Benefiber 1 to 2 tablespoons daily  Call in 3 days for biopsies  Call immediately for any postop problems  If 3 or more polyps are adenomas repeat colonoscopy in 3 years is recommended and if 2 or fewer are adenomas, repeat in 5 years is  recommended

## 2021-02-18 LAB
LAB AP CASE REPORT: NORMAL
PATH REPORT.FINAL DX SPEC: NORMAL
PATH REPORT.GROSS SPEC: NORMAL

## 2021-03-17 ENCOUNTER — OFFICE VISIT (OUTPATIENT)
Dept: CARDIOLOGY | Facility: CLINIC | Age: 71
End: 2021-03-17

## 2021-03-17 VITALS
HEART RATE: 94 BPM | HEIGHT: 68 IN | SYSTOLIC BLOOD PRESSURE: 172 MMHG | DIASTOLIC BLOOD PRESSURE: 90 MMHG | RESPIRATION RATE: 18 BRPM | BODY MASS INDEX: 31.67 KG/M2 | WEIGHT: 209 LBS

## 2021-03-17 DIAGNOSIS — I10 WHITE COAT SYNDROME WITH DIAGNOSIS OF HYPERTENSION: ICD-10-CM

## 2021-03-17 DIAGNOSIS — R00.2 PALPITATIONS: ICD-10-CM

## 2021-03-17 DIAGNOSIS — I25.10 CORONARY ARTERY DISEASE DUE TO LIPID RICH PLAQUE: ICD-10-CM

## 2021-03-17 DIAGNOSIS — I25.83 CORONARY ARTERY DISEASE DUE TO LIPID RICH PLAQUE: ICD-10-CM

## 2021-03-17 DIAGNOSIS — E78.2 MIXED HYPERLIPIDEMIA: Primary | ICD-10-CM

## 2021-03-17 PROCEDURE — 99214 OFFICE O/P EST MOD 30 MIN: CPT | Performed by: INTERNAL MEDICINE

## 2021-03-17 NOTE — PROGRESS NOTES
Subjective:     Encounter Date:03/17/2021      Patient ID: Bigg Spear is a 70 y.o. female.    Chief Complaint:  Chief Complaint   Patient presents with   • Follow-up       HPI:  69-year-old female patient with coronary artery risk factor significant for hypertension mixed dyslipidemia who usually presented to [facility] with hypertensive emergency and a troponin elevation.  She underwent cardiac catheterization (4/27/2018: No more than 10% luminal irregularity with mildly elevated left ventricular end-diastolic filling pressures).    She returns today for routine follow-up.  I personally reviewed her most recent lipid profile Trg 78 HD 87  with and extremely favorable Trg/Hd ratio of ~0.9 indicating non-inflammatory state.  Amazingly, despite her severely elevated LDL her remaining lipid profile shows indicates cardioprotection; with a risk of developing death from coronary heart disease of less than 2% by standard classification.  Last visit we had a long discussion of eliminating carbohydrate rich foods from her diet and focusing on healthy fats and protein with cooked vegetables and fruits as an adjunctive.    Our last follow-up was via video tele-visit routinely. And had her antihypertensive regimen changed recently.  Given her diastolic dysfunction with elevated left heart filling pressures, she was on lisinopril as a used to prevent positive remodeling of the left ventricle (left ventricular hypertrophy).  We had a long discussion regarding antihypertensive regimens used for blood pressure control versus antihypertensives use for cardiac remodeling benefits.  We changed her to amlodipine only 5 mg.    Her blood pressures been very well controlled over the past year on 5 mg of amlodipine only.  Of note her last laboratory data repeated which again shows an excellent profile with non-atherogenic dyslipidemia and a triglyceride 2 HDL ratio of 1.0 and a total cholesterol to HDL ratio  "at 4.  Although her LDL is elevated given her inability to fractionate LDL via NMR, and her overall favorable ratios with a protective HDL.        The following portions of the patient's history were reviewed and updated as appropriate: allergies, current medications, past family history, past medical history, past social history, past surgical history and problem list.    Problem List:  Patient Active Problem List   Diagnosis   • Hyperlipidemia   • Hypertension   • Coronary artery disease due to lipid rich plaque   • Anxiety disorder   • Carpal tunnel syndrome   • Encounter for general adult medical examination without abnormal findings   • History of prosthetic heart valve   • Obesity   • Encounter for screening for malignant neoplasm of colon   • Postmenopausal status   • Skin lesion   • Vitamin D deficiency   • White coat syndrome with diagnosis of hypertension   • Palpitations   • Dyslipidemia       Past Medical History:  Past Medical History:   Diagnosis Date   • Abdominal pain 02/23021   • Anesthesia     was told \"we almost lost you\" after anesthesia, but cannot further explain.  Was told she was given too much anesthesia   • Anxiety    • Borderline diabetes    • Constipation 02/2021   • Coronary artery disease due to lipid rich plaque 7/17/2019   • Coronary artery disease due to lipid rich plaque 7/17/2019   • Dyslipidemia 6/10/2020   • Fibrocystic breast    • Hyperlipidemia    • Hypertension    • NSTEMI (non-ST elevated myocardial infarction) (CMS/[facility]) 04/27/2018    Dr. Quinn told pt that she did not have NSEMI (but told she did by PCP)   • White coat syndrome with high blood pressure but without hypertension        Past Surgical History:  Past Surgical History:   Procedure Laterality Date   • CARDIAC CATHETERIZATION  04/28/2019    Mildly elevated left heart filling pressures, normal epicardial anatomy with no significant CAD   • COLONOSCOPY N/A 2/17/2021    Procedure: COLONOSCOPY WITH POLYPECTOMY X7;  " "Surgeon: Bang Santamaria MD;  Location: [facility] ENDOSCOPY;  Service: Gastroenterology;  Laterality: N/A;  POLYPS, INTERNAL HEMORRHOIDS, MELANOSIS COLI   • D & C AND LAPAROSCOPY         Social History:  Social History     Socioeconomic History   • Marital status:      Spouse name: Not on file   • Number of children: Not on file   • Years of education: Not on file   • Highest education level: Not on file   Tobacco Use   • Smoking status: Never Smoker   • Smokeless tobacco: Never Used   Substance and Sexual Activity   • Alcohol use: No   • Drug use: No   • Sexual activity: Defer       Allergies:  Allergies   Allergen Reactions   • Dioxybenzone Shortness Of Breath   • Contrast Dye Arrhythmia   • Dapsone Other (See Comments)   • Amoxicillin Palpitations         Review of Symptoms:  Constitutional: Patient afebrile no chills or unexpected weight changes  Respiratory: No cough, no wheezing or dyspnea  Cardiovascular: Today the patient complains of no chest pain, palpitations, dyspnea, orthopnea and no edema  Gastrointestinal: No nausea, vomiting, constipation or diarrhea.  No melena or dark stools    All other systems reviewed and are negative             Objective:         /90 (BP Location: Left arm, Patient Position: Sitting)   Pulse 94   Resp 18   Ht 172.7 cm (68\")   Wt 94.8 kg (209 lb)   BMI 31.78 kg/m²     Physical exam  Constitutional: well-nourished, and appears stated age in no acute distress  PERRL: Conjunctiva clear, no pallor, anicteric  HENMT: normocephalic, normal dentition, no cyanosis or pallor  Neck:no bruits, or thrills and bilateral normal carotid upstroke. Normal jugular venous pressure  Cardiovascular: No parasternal heaves an non-displaced focal PMI. Normal rate and rhythm: no rub, gallop, murmur or click and normal S1 and S2; no lower or upper extremity edema.   Lungs: unlabored, no wheezing with no rales or rhonchi on auscultation.  Extremities: Warm, no clubbing, cyanosis. " Full and equal peripheral pulses in extremities with no bruits appreciated.   Abdomen: soft, non-tender, non-distended  Musculoskeletal: no joint tenderness or swelling and no erythema  Skin: Warm and dry, non-erythematous   Neuro:alert and normal affect. Oriented to time, place and person.         In-Office Procedure(s):  Procedures    ASCVD RIsk Score::  The ASCVD Risk score (Jcarlos MICK Jr., et al., 2013) failed to calculate for the following reasons:    The valid total cholesterol range is 130 to 320 mg/dL    Recent Radiology:  Imaging Results (Most Recent)     None          Lab Review:   Admission on 02/17/2021, Discharged on 02/17/2021   Component Date Value   • Case Report 02/17/2021                      Value:Surgical Pathology Report                         Case: [id number]                                  Authorizing Provider:  Bang Santamaria MD Collected:           02/17/2021 11:04 AM          Ordering Location:     [facility]  Received:            02/17/2021 02:38 PM                                 SUITES                                                                       Pathologist:           Rafael Dorado MD                                                             Specimen:    Large Intestine, Left / Descending Colon, x7                                              • Final Diagnosis 02/17/2021                      Value:This result contains rich text formatting which cannot be displayed here.   • Gross Description 02/17/2021                      Value:This result contains rich text formatting which cannot be displayed here.   Lab on 02/15/2021   Component Date Value   • COVID19 02/15/2021 Not Detected               Invalid input(s): ALKPO4                        Invalid input(s): LDLCALC                Assessment:          Diagnosis Plan   1. White coat syndrome with diagnosis of hypertension     2. Palpitations     3. Mixed hyperlipidemia     4. Coronary artery disease due to  lipid rich plaque            Plan:         1. White coat syndrome with diagnosis of hypertension  Repeat blood pressure measurement in the office was 150/75 mmHg.  However her diary shows very well controlled hypertension on amlodipine.    2. Palpitations  Resolved    3. Mixed hyperlipidemia  Based on her ratios she is doing very well.  However I consulted a lipidologist at [place] who agrees that overall this is an nonatherogenic phenotype; however we will get an ApoB which is the best direct measure of atherogenic city.  We will also get an Lpa.    4. Coronary artery disease due to lipid rich plaque  Mild luminal irregularities and asymptomatic.           Ed Quinn MD  03/17/21  .

## 2021-06-16 ENCOUNTER — OFFICE VISIT (OUTPATIENT)
Dept: FAMILY MEDICINE CLINIC | Facility: CLINIC | Age: 71
End: 2021-06-16

## 2021-06-16 ENCOUNTER — LAB (OUTPATIENT)
Dept: FAMILY MEDICINE CLINIC | Facility: CLINIC | Age: 71
End: 2021-06-16

## 2021-06-16 VITALS
OXYGEN SATURATION: 97 % | HEIGHT: 68 IN | SYSTOLIC BLOOD PRESSURE: 169 MMHG | TEMPERATURE: 98 F | HEART RATE: 80 BPM | WEIGHT: 225 LBS | DIASTOLIC BLOOD PRESSURE: 82 MMHG | BODY MASS INDEX: 34.1 KG/M2

## 2021-06-16 DIAGNOSIS — I10 ESSENTIAL HYPERTENSION: ICD-10-CM

## 2021-06-16 DIAGNOSIS — E78.2 MIXED HYPERLIPIDEMIA: ICD-10-CM

## 2021-06-16 DIAGNOSIS — Z12.31 BREAST CANCER SCREENING BY MAMMOGRAM: ICD-10-CM

## 2021-06-16 DIAGNOSIS — Z00.01 ENCOUNTER FOR GENERAL ADULT MEDICAL EXAMINATION WITH ABNORMAL FINDINGS: Primary | ICD-10-CM

## 2021-06-16 LAB
ALBUMIN SERPL-MCNC: 4.3 G/DL (ref 3.5–5.2)
ALBUMIN/GLOB SERPL: 1.4 G/DL
ALP SERPL-CCNC: 80 U/L (ref 39–117)
ALT SERPL W P-5'-P-CCNC: 30 U/L (ref 1–33)
ANION GAP SERPL CALCULATED.3IONS-SCNC: 9.3 MMOL/L (ref 5–15)
AST SERPL-CCNC: 28 U/L (ref 1–32)
BILIRUB SERPL-MCNC: 0.3 MG/DL (ref 0–1.2)
BUN SERPL-MCNC: 12 MG/DL (ref 8–23)
BUN/CREAT SERPL: 16 (ref 7–25)
CALCIUM SPEC-SCNC: 9.2 MG/DL (ref 8.6–10.5)
CHLORIDE SERPL-SCNC: 101 MMOL/L (ref 98–107)
CHOLEST SERPL-MCNC: 359 MG/DL (ref 0–200)
CO2 SERPL-SCNC: 26.7 MMOL/L (ref 22–29)
CREAT SERPL-MCNC: 0.75 MG/DL (ref 0.57–1)
GFR SERPL CREATININE-BSD FRML MDRD: 92 ML/MIN/1.73
GLOBULIN UR ELPH-MCNC: 3.1 GM/DL
GLUCOSE SERPL-MCNC: 103 MG/DL (ref 65–99)
HDLC SERPL-MCNC: 103 MG/DL (ref 40–60)
LDLC SERPL CALC-MCNC: 244 MG/DL (ref 0–100)
LDLC/HDLC SERPL: 2.33 {RATIO}
POTASSIUM SERPL-SCNC: 4 MMOL/L (ref 3.5–5.2)
PROT SERPL-MCNC: 7.4 G/DL (ref 6–8.5)
SODIUM SERPL-SCNC: 137 MMOL/L (ref 136–145)
TRIGL SERPL-MCNC: 82 MG/DL (ref 0–150)
VLDLC SERPL-MCNC: 12 MG/DL (ref 5–40)

## 2021-06-16 PROCEDURE — 80053 COMPREHEN METABOLIC PANEL: CPT | Performed by: FAMILY MEDICINE

## 2021-06-16 PROCEDURE — 99213 OFFICE O/P EST LOW 20 MIN: CPT | Performed by: FAMILY MEDICINE

## 2021-06-16 PROCEDURE — 80061 LIPID PANEL: CPT | Performed by: FAMILY MEDICINE

## 2021-06-16 PROCEDURE — 1170F FXNL STATUS ASSESSED: CPT | Performed by: FAMILY MEDICINE

## 2021-06-16 PROCEDURE — 36415 COLL VENOUS BLD VENIPUNCTURE: CPT | Performed by: FAMILY MEDICINE

## 2021-06-16 PROCEDURE — G0439 PPPS, SUBSEQ VISIT: HCPCS | Performed by: FAMILY MEDICINE

## 2021-06-16 PROCEDURE — 1160F RVW MEDS BY RX/DR IN RCRD: CPT | Performed by: FAMILY MEDICINE

## 2021-06-16 NOTE — PROGRESS NOTES
The ABCs of the Annual Wellness Visit  Subsequent Medicare Wellness Visit    Chief Complaint   Patient presents with   • Medicare Wellness-subsequent       Subjective   History of Present Illness:  Bigg Spear is a 70 y.o. female who presents for a Subsequent Medicare Wellness Visit.  She also needs follow up on bp and chol  BP at home runs 120/70  She does not plan to get the covid vaccines  Problems with her allergies this season  Does not need refills    HEALTH RISK ASSESSMENT    Recent Hospitalizations:  No hospitalization(s) within the last year.    Current Medical Providers:  Patient Care Team:  Stephanie Morris MD as PCP - General (Family Medicine)  Ed Quinn MD as Consulting Physician (Cardiology)  Jess Baez OD (Optometry)  Jarrell Guo MD as Consulting Physician (Ophthalmology)    Smoking Status:  Social History     Tobacco Use   Smoking Status Never Smoker   Smokeless Tobacco Never Used       Alcohol Consumption:  Social History     Substance and Sexual Activity   Alcohol Use No       Depression Screen:   PHQ-2/PHQ-9 Depression Screening 6/16/2021   Little interest or pleasure in doing things 0   Feeling down, depressed, or hopeless 0   Total Score 0       Fall Risk Screen:  STEADI Fall Risk Assessment was completed, and patient is at LOW risk for falls.Assessment completed on:6/16/2021    Health Habits and Functional and Cognitive Screening:  Functional & Cognitive Status 6/16/2021   Do you have difficulty preparing food and eating? No   Do you have difficulty bathing yourself, getting dressed or grooming yourself? No   Do you have difficulty using the toilet? No   Do you have difficulty moving around from place to place? No   Do you have trouble with steps or getting out of a bed or a chair? No   Current Diet Well Balanced Diet   Dental Exam Not up to date   Eye Exam Up to date   Exercise (times per week) 6 times per week   Current Exercise Activities Include  Walking   Do you need help using the phone?  No   Are you deaf or do you have serious difficulty hearing?  No   Do you need help with transportation? No   Do you need help shopping? No   Do you need help preparing meals?  No   Do you need help with housework?  No   Do you need help with laundry? No   Do you need help taking your medications? No   Do you need help managing money? No   Do you ever drive or ride in a car without wearing a seat belt? No   Have you felt unusual stress, anger or loneliness in the last month? No   Who do you live with? Spouse   If you need help, do you have trouble finding someone available to you? No   Have you been bothered in the last four weeks by sexual problems? No   Do you have difficulty concentrating, remembering or making decisions? No         Does the patient have evidence of cognitive impairment? No   MMSE 30/30    Asprin use counseling:Taking ASA appropriately as indicated    Age-appropriate Screening Schedule:  Refer to the list below for future screening recommendations based on patient's age, sex and/or medical conditions. Orders for these recommended tests are listed in the plan section. The patient has been provided with a written plan.    Health Maintenance   Topic Date Due   • DXA SCAN  Never done   • TDAP/TD VACCINES (1 - Tdap) Never done   • ZOSTER VACCINE (1 of 2) 12/01/2021 (Originally 11/2/1999)   • MAMMOGRAM  07/29/2021   • INFLUENZA VACCINE  08/01/2021   • LIPID PANEL  12/08/2021          The following portions of the patient's history were reviewed and updated as appropriate: allergies, current medications, past family history, past medical history, past social history, past surgical history and problem list.    Outpatient Medications Prior to Visit   Medication Sig Dispense Refill   • albuterol sulfate  (90 Base) MCG/ACT inhaler Inhale 2 puffs Every 4 (Four) Hours As Needed for Wheezing. 1 inhaler 1   • amLODIPine (NORVASC) 5 MG tablet Take 1 tablet by  mouth once daily (Patient taking differently: Take 5 mg by mouth Daily. Take dos) 90 tablet 3   • Garlic 1000 MG capsule Take 1 tablet by mouth Daily. Do not take dos     • Multiple Vitamins-Minerals (MULTIVITAMIN WITH MINERALS) tablet tablet Take 1 tablet by mouth Daily. Do not take dos     • NON FORMULARY Blood pressure formulary- has multiple supplements as ingredients  Do not take dos     • Omega-3 Fatty Acids (EQL OMEGA 3 FISH OIL) 1400 MG capsule Take 1 capsule by mouth 2 (Two) Times a Day.       No facility-administered medications prior to visit.       Patient Active Problem List   Diagnosis   • Hyperlipidemia   • Hypertension   • Coronary artery disease due to lipid rich plaque   • Anxiety disorder   • Carpal tunnel syndrome   • Encounter for general adult medical examination with abnormal findings   • History of prosthetic heart valve   • Obesity   • Encounter for screening for malignant neoplasm of colon   • Postmenopausal status   • Skin lesion   • Vitamin D deficiency   • White coat syndrome with diagnosis of hypertension   • Palpitations   • Dyslipidemia       Advanced Care Planning:  ACP discussion was held with the patient during this visit. Patient does not have an advance directive, information provided.    Review of Systems   Constitutional: Negative.    Respiratory: Negative.    Cardiovascular: Negative.    Endocrine: Negative.    Allergic/Immunologic: Positive for environmental allergies.   Neurological: Negative for dizziness, syncope, light-headedness and headaches.   Psychiatric/Behavioral: Negative.        Compared to one year ago, the patient feels her physical health is the same.  Compared to one year ago, the patient feels her mental health is the same.    Reviewed chart for potential of high risk medication in the elderly: yes  Reviewed chart for potential of harmful drug interactions in the elderly:yes    Objective         Vitals:    06/16/21 0901   BP: 169/82   BP Location: Left arm  "  Patient Position: Sitting   Cuff Size: Large Adult   Pulse: 80   Temp: 98 °F (36.7 °C)   TempSrc: Temporal   SpO2: 97%   Weight: 102 kg (225 lb)   Height: 172.7 cm (68\")       Body mass index is 34.21 kg/m².  Discussed the patient's BMI with her. The BMI is above average; BMI management plan is completed.    Physical Exam  Vitals and nursing note reviewed.   Constitutional:       General: She is not in acute distress.     Appearance: Normal appearance. She is well-developed and well-groomed. She is obese.   HENT:      Head: Normocephalic and atraumatic.   Neck:      Thyroid: No thyromegaly.   Cardiovascular:      Rate and Rhythm: Normal rate and regular rhythm.      Heart sounds: Normal heart sounds. No murmur heard.   No friction rub. No gallop.    Pulmonary:      Effort: Pulmonary effort is normal. No respiratory distress.      Breath sounds: Normal breath sounds. No wheezing or rales.   Musculoskeletal:      Cervical back: Neck supple.      Right lower leg: No edema.      Left lower leg: No edema.   Lymphadenopathy:      Cervical: No cervical adenopathy.   Skin:     General: Skin is warm and dry.   Neurological:      Mental Status: She is alert and oriented to person, place, and time.   Psychiatric:         Mood and Affect: Mood normal.         Behavior: Behavior is cooperative.               Assessment/Plan   Medicare Risks and Personalized Health Plan  CMS Preventative Services Quick Reference  Obesity/Overweight     The above risks/problems have been discussed with the patient.  Pertinent information has been shared with the patient in the After Visit Summary.  Follow up plans and orders are seen below in the Assessment/Plan Section.    Diagnoses and all orders for this visit:    1. Encounter for general adult medical examination with abnormal findings (Primary)    2. Mixed hyperlipidemia  -     Comprehensive Metabolic Panel  -     Lipid Panel; Future    3. Essential hypertension  -     Comprehensive " Metabolic Panel  -     Lipid Panel; Future    4. Breast cancer screening by mammogram  -     Mammo Screening Digital Tomosynthesis Bilateral With CAD; Future      Follow Up:  Return in about 1 year (around 6/16/2022) for Recheck, Medicare Wellness.     An After Visit Summary and PPPS were given to the patient.       She is UTD on colonoscopy  Mammogram ordered  Counseled on weight loss  bp stable  Labs ordered  She has not had her covid vaccine  She refuses other vaccines

## 2021-06-17 ENCOUNTER — TELEPHONE (OUTPATIENT)
Dept: FAMILY MEDICINE CLINIC | Facility: CLINIC | Age: 71
End: 2021-06-17

## 2021-06-17 NOTE — TELEPHONE ENCOUNTER
Caller: Bigg Hawkins    Relationship: Self    Best call back number: [phone] (H)    Caller requesting test results: BIGG HAWKINS    What test was performed: BLOOD WORK    When was the test performed: 06/16/2021    Where was the test performed: IN OFFICE    Additional notes: PATIENT DOES NOT HAVE A CELL PHONE, SON'S CELL PHONE WAS ON HER CHART, PLEASE CALL PATIENT ON HOME PHONE ASAP WITH LAB WORK RESULTS

## 2021-07-26 ENCOUNTER — HOSPITAL ENCOUNTER (OUTPATIENT)
Dept: MAMMOGRAPHY | Facility: HOSPITAL | Age: 71
Discharge: HOME | End: 2021-07-26
Admitting: FAMILY MEDICINE

## 2021-07-26 DIAGNOSIS — Z12.31 BREAST CANCER SCREENING BY MAMMOGRAM: ICD-10-CM

## 2021-07-26 PROCEDURE — 77063 BREAST TOMOSYNTHESIS BI: CPT

## 2021-07-26 PROCEDURE — 77067 SCR MAMMO BI INCL CAD: CPT

## 2021-08-02 RX ORDER — AMLODIPINE BESYLATE 5 MG/1
TABLET ORAL
Qty: 90 TABLET | Refills: 2 | Status: SHIPPED | OUTPATIENT
Start: 2021-08-02 | End: 2022-05-09

## 2021-08-18 ENCOUNTER — OFFICE VISIT (OUTPATIENT)
Dept: CARDIOLOGY | Facility: CLINIC | Age: 71
End: 2021-08-18

## 2021-08-18 VITALS
RESPIRATION RATE: 18 BRPM | OXYGEN SATURATION: 96 % | HEART RATE: 85 BPM | BODY MASS INDEX: 35.31 KG/M2 | SYSTOLIC BLOOD PRESSURE: 156 MMHG | DIASTOLIC BLOOD PRESSURE: 100 MMHG | WEIGHT: 233 LBS | HEIGHT: 68 IN

## 2021-08-18 DIAGNOSIS — R00.2 PALPITATIONS: ICD-10-CM

## 2021-08-18 DIAGNOSIS — I25.10 CORONARY ARTERY DISEASE DUE TO LIPID RICH PLAQUE: Primary | ICD-10-CM

## 2021-08-18 DIAGNOSIS — E78.5 DYSLIPIDEMIA: ICD-10-CM

## 2021-08-18 DIAGNOSIS — I10 ESSENTIAL HYPERTENSION: ICD-10-CM

## 2021-08-18 DIAGNOSIS — I25.83 CORONARY ARTERY DISEASE DUE TO LIPID RICH PLAQUE: Primary | ICD-10-CM

## 2021-08-18 PROCEDURE — 99214 OFFICE O/P EST MOD 30 MIN: CPT | Performed by: INTERNAL MEDICINE

## 2021-08-18 NOTE — PROGRESS NOTES
Subjective:     Encounter Date:08/18/2021      Patient ID: Bigg Spear is a 70 y.o. female.    Chief Complaint:  Chief Complaint   Patient presents with   • Coronary Artery Disease   • Hypertension   • Hyperlipidemia       HPI:  70-year-old female patient with coronary artery risk factor significant for hypertension mixed dyslipidemia who usually presented to [facility] with hypertensive emergency and a troponin elevation.  She underwent cardiac catheterization (4/27/2018: No more than 10% luminal irregularity with mildly elevated left ventricular end-diastolic filling pressures).    She returns today for routine follow-up.  I personally reviewed her most recent lipid profile Trg 78 HD 87  with and extremely favorable Trg/Hd ratio of ~0.9 indicating non-inflammatory state.  Amazingly, despite her severely elevated LDL her remaining lipid profile shows indicates cardioprotection; with a risk of developing death from coronary heart disease of less than 2% by standard classification.  Last visit we had a long discussion of eliminating carbohydrate rich foods from her diet and focusing on healthy fats and protein with cooked vegetables and fruits as an adjunctive.    Our last follow-up was via video tele-visit routinely. And had her antihypertensive regimen changed recently.  Given her diastolic dysfunction with elevated left heart filling pressures, she was on lisinopril as a used to prevent positive remodeling of the left ventricle (left ventricular hypertrophy).  We had a long discussion regarding antihypertensive regimens used for blood pressure control versus antihypertensives use for cardiac remodeling benefits.  We changed her to amlodipine only 5 mg.    Her blood pressures been very well controlled over the past year on 5 mg of amlodipine only.  Of note her last laboratory data repeated which again shows an excellent profile with non-atherogenic dyslipidemia and a triglyceride 2 HDL  "ratio of 1.0 and a total cholesterol to HDL ratio at 4.  Although her LDL is elevated given her inability to fractionate LDL via NMR, and her overall favorable ratios with a protective HDL.  Once again her cholesterol profile shows HDL of 103 with an LDL of 244 and a triglyceride level of 82.  The plan was to fractionate LDL looking for LP(a) and APO B.  However this was not done.    Today she has no complaints from a cardiovascular standpoint.    Addendum:    We performed her APO B and LP(a) test which showed normal LPA which has the strongest predictive value in terms of atherogenic dyslipidemia which was 47.6 (normal is less than 75 nmol/L) putting her risk for acute MI at around 25 to 30%.  However her APO B was 165, mildly elevated.    The following portions of the patient's history were reviewed and updated as appropriate: allergies, current medications, past family history, past medical history, past social history, past surgical history and problem list.    Problem List:  Patient Active Problem List   Diagnosis   • Hyperlipidemia   • Hypertension   • Coronary artery disease due to lipid rich plaque   • Anxiety disorder   • Carpal tunnel syndrome   • Encounter for general adult medical examination with abnormal findings   • History of prosthetic heart valve   • Obesity   • Encounter for screening for malignant neoplasm of colon   • Postmenopausal status   • Skin lesion   • Vitamin D deficiency   • White coat syndrome with diagnosis of hypertension   • Palpitations   • Dyslipidemia       Past Medical History:  Past Medical History:   Diagnosis Date   • Abdominal pain 02/23021   • Anesthesia     was told \"we almost lost you\" after anesthesia, but cannot further explain.  Was told she was given too much anesthesia   • Anxiety    • Borderline diabetes    • Constipation 02/2021   • Coronary artery disease due to lipid rich plaque 7/17/2019   • Coronary artery disease due to lipid rich plaque 7/17/2019   • " Dyslipidemia 6/10/2020   • Fibrocystic breast    • Hyperlipidemia    • Hypertension    • NSTEMI (non-ST elevated myocardial infarction) (CMS/[facility]) 04/27/2018    Dr. Quinn told pt that she did not have NSEMI (but told she did by PCP)   • White coat syndrome with high blood pressure but without hypertension        Past Surgical History:  Past Surgical History:   Procedure Laterality Date   • CARDIAC CATHETERIZATION  04/28/2019    Mildly elevated left heart filling pressures, normal epicardial anatomy with no significant CAD   • CATARACT EXTRACTION, BILATERAL     • COLONOSCOPY N/A 2/17/2021    Procedure: COLONOSCOPY WITH POLYPECTOMY X7;  Surgeon: Bang Santamaria MD;  Location: [facility] ENDOSCOPY;  Service: Gastroenterology;  Laterality: N/A;  POLYPS, INTERNAL HEMORRHOIDS, MELANOSIS COLI   • D & C AND LAPAROSCOPY         Social History:  Social History     Socioeconomic History   • Marital status:      Spouse name: Not on file   • Number of children: Not on file   • Years of education: Not on file   • Highest education level: Not on file   Tobacco Use   • Smoking status: Never Smoker   • Smokeless tobacco: Never Used   Vaping Use   • Vaping Use: Never used   Substance and Sexual Activity   • Alcohol use: No   • Drug use: No   • Sexual activity: Defer       Allergies:  Allergies   Allergen Reactions   • Dioxybenzone Shortness Of Breath   • Contrast Dye Arrhythmia   • Dapsone Other (See Comments)   • Amoxicillin Palpitations           Review of Symptoms:  Constitutional: Patient afebrile no chills or unexpected weight changes  Respiratory: No cough, no wheezing or dyspnea  Cardiovascular: Today the patient complains of no chest pain, palpitations, dyspnea, orthopnea and no edema  Gastrointestinal: No nausea, vomiting, constipation or diarrhea.  No melena or dark stools    All other systems reviewed and are negative           Objective:         /100 (BP Location: Left arm, Patient Position: Sitting,  "Cuff Size: Large Adult)   Pulse 85   Resp 18   Ht 172.7 cm (68\")   Wt 106 kg (233 lb)   SpO2 96%   BMI 35.43 kg/m²       Physical exam  Constitutional: well-nourished, and appears stated age in no acute distress  PERRL: Conjunctiva clear, no pallor, anicteric  HENMT: normocephalic, normal dentition, no cyanosis or pallor  Neck:no bruits, or thrills and bilateral normal carotid upstroke. Normal jugular venous pressure  Cardiovascular: No parasternal heaves an non-displaced focal PMI. Normal rate and rhythm: no rub, gallop, murmur or click and normal S1 and S2; no lower or upper extremity edema.   Lungs: unlabored, no wheezing with no rales or rhonchi on auscultation.  Extremities: Warm, no clubbing, cyanosis. Full and equal peripheral pulses in extremities with no bruits appreciated.   Abdomen: soft, non-tender, non-distended  Musculoskeletal: no joint tenderness or swelling and no erythema  Skin: Warm and dry, non-erythematous   Neuro:alert and normal affect. Oriented to time, place and person.       In-Office Procedure(s):  Procedures    ASCVD RIsk Score::  The ASCVD Risk score (Crawford DC Jr., et al., 2013) failed to calculate for the following reasons:    The valid HDL cholesterol range is 20 to 100 mg/dL    The valid total cholesterol range is 130 to 320 mg/dL    Recent Radiology:  Imaging Results (Most Recent)     None          Lab Review:   No visits with results within 2 Month(s) from this visit.   Latest known visit with results is:   Lab on 06/16/2021   Component Date Value   • Total Cholesterol 06/16/2021 359*   • Triglycerides 06/16/2021 82    • HDL Cholesterol 06/16/2021 103*   • LDL Cholesterol  06/16/2021 244*   • VLDL Cholesterol 06/16/2021 12    • LDL/HDL Ratio 06/16/2021 2.33               Invalid input(s): ALKPO4                        Invalid input(s): LDLCALC                Assessment:          Diagnosis Plan   1. Coronary artery disease due to lipid rich plaque     2. Dyslipidemia     3. " Essential hypertension     4. Palpitations            Plan:         1. Coronary artery disease due to lipid rich plaque  Nonobstructive and clinically silent.    2. Dyslipidemia  I agree with her primary care physician that despite her amazingly low triglyceride to HDL ratio, there is a concern for her increasing LDL.  Therefore we must get an LP(a) and APO B to functionally fractionate her LDL and determine the content as this is a calculated and unmeasured component of the lipid profile.  If her APO B and LP(a) are elevated she must initiate statin therapy.    3. Essential hypertension  Well-controlled on medical therapy    4. Palpitations  Stable                 Ed Quinn MD  08/18/21  .

## 2021-08-19 ENCOUNTER — LAB (OUTPATIENT)
Dept: LAB | Facility: HOSPITAL | Age: 71
End: 2021-08-19

## 2021-08-19 DIAGNOSIS — E78.2 MIXED HYPERLIPIDEMIA: ICD-10-CM

## 2021-08-19 PROCEDURE — 82172 ASSAY OF APOLIPOPROTEIN: CPT

## 2021-08-19 PROCEDURE — 36415 COLL VENOUS BLD VENIPUNCTURE: CPT

## 2021-08-19 PROCEDURE — 83695 ASSAY OF LIPOPROTEIN(A): CPT

## 2021-08-20 LAB — LPA SERPL-SCNC: 47.6 NMOL/L

## 2021-08-21 LAB — APO B SERPL-MCNC: 165 MG/DL

## 2021-08-27 DIAGNOSIS — I25.83 CORONARY ARTERY DISEASE DUE TO LIPID RICH PLAQUE: Primary | ICD-10-CM

## 2021-08-27 DIAGNOSIS — I25.10 CORONARY ARTERY DISEASE DUE TO LIPID RICH PLAQUE: Primary | ICD-10-CM

## 2021-08-27 DIAGNOSIS — E78.5 DYSLIPIDEMIA: ICD-10-CM

## 2021-10-11 ENCOUNTER — LAB (OUTPATIENT)
Dept: LAB | Facility: HOSPITAL | Age: 71
End: 2021-10-11

## 2021-10-11 DIAGNOSIS — I25.10 CORONARY ARTERY DISEASE DUE TO LIPID RICH PLAQUE: ICD-10-CM

## 2021-10-11 DIAGNOSIS — E78.5 DYSLIPIDEMIA: ICD-10-CM

## 2021-10-11 DIAGNOSIS — I25.83 CORONARY ARTERY DISEASE DUE TO LIPID RICH PLAQUE: ICD-10-CM

## 2021-10-11 PROCEDURE — 82172 ASSAY OF APOLIPOPROTEIN: CPT

## 2021-10-11 PROCEDURE — 36415 COLL VENOUS BLD VENIPUNCTURE: CPT

## 2021-10-11 PROCEDURE — 83695 ASSAY OF LIPOPROTEIN(A): CPT

## 2021-10-12 LAB
APO B SERPL-MCNC: 141 MG/DL
LPA SERPL-SCNC: 33.4 NMOL/L

## 2022-03-02 ENCOUNTER — OFFICE VISIT (OUTPATIENT)
Dept: CARDIOLOGY | Facility: CLINIC | Age: 72
End: 2022-03-02

## 2022-03-02 VITALS
WEIGHT: 230 LBS | SYSTOLIC BLOOD PRESSURE: 140 MMHG | OXYGEN SATURATION: 97 % | DIASTOLIC BLOOD PRESSURE: 90 MMHG | HEIGHT: 68 IN | HEART RATE: 84 BPM | RESPIRATION RATE: 18 BRPM | BODY MASS INDEX: 34.86 KG/M2

## 2022-03-02 DIAGNOSIS — I10 PRIMARY HYPERTENSION: ICD-10-CM

## 2022-03-02 DIAGNOSIS — I25.10 CORONARY ARTERY DISEASE DUE TO LIPID RICH PLAQUE: Primary | ICD-10-CM

## 2022-03-02 DIAGNOSIS — R00.2 PALPITATIONS: ICD-10-CM

## 2022-03-02 DIAGNOSIS — I25.83 CORONARY ARTERY DISEASE DUE TO LIPID RICH PLAQUE: Primary | ICD-10-CM

## 2022-03-02 DIAGNOSIS — E78.5 DYSLIPIDEMIA: ICD-10-CM

## 2022-03-02 PROCEDURE — 99214 OFFICE O/P EST MOD 30 MIN: CPT | Performed by: INTERNAL MEDICINE

## 2022-03-02 NOTE — PROGRESS NOTES
Subjective:     Encounter Date:03/02/2022      Patient ID: Bigg Spear is a 71 y.o. female.    Chief Complaint:  Chief Complaint   Patient presents with   • Coronary Artery Disease   • Hypertension   • Hyperlipidemia       HPI:  71-year-old female patient with coronary artery risk factor significant for hypertension mixed dyslipidemia who usually presented to [facility] with hypertensive emergency and a troponin elevation.  She underwent cardiac catheterization (4/27/2018: No more than 10% luminal irregularity with mildly elevated left ventricular end-diastolic filling pressures).    She returns today for routine follow-up.  I personally reviewed her most recent lipid profile Trg 78 HD 87  with and extremely favorable Trg/Hd ratio of ~0.9 indicating non-inflammatory state.  Amazingly, despite her severely elevated LDL her remaining lipid profile shows indicates cardioprotection; with a risk of developing death from coronary heart disease of less than 2% by standard classification.  Last visit we had a long discussion of eliminating carbohydrate rich foods from her diet and focusing on healthy fats and protein with cooked vegetables and fruits as an adjunctive.    Our last follow-up was via video tele-visit routinely. And had her antihypertensive regimen changed recently.  Given her diastolic dysfunction with elevated left heart filling pressures, she was on lisinopril as a used to prevent positive remodeling of the left ventricle (left ventricular hypertrophy).  We had a long discussion regarding antihypertensive regimens used for blood pressure control versus antihypertensives use for cardiac remodeling benefits.  We changed her to amlodipine only 5 mg.    Her blood pressures been very well controlled over the past year on 5 mg of amlodipine only.  Of note her last laboratory data repeated which again shows an excellent profile with non-atherogenic dyslipidemia and a triglyceride 2 HDL  "ratio of 1.0 and a total cholesterol to HDL ratio at 4.  Although her LDL is elevated given her inability to fractionate LDL via NMR, and her overall favorable ratios with a protective HDL.  Once again her cholesterol profile shows HDL of 103 with an LDL of 244 and a triglyceride level of 82.  The plan was to fractionate LDL looking for LP(a) and APO B.  However this was not done.    We performed her APO B and LP(a) test which showed normal LPA which has the strongest predictive value in terms of atherogenic dyslipidemia which was 47.6 (normal is less than 75 nmol/L) putting her risk for acute MI at around 25 to 30%.  However her APO B was 165, mildly elevated.    She presents with no complaints from a cardiovascular standpoint    The following portions of the patient's history were reviewed and updated as appropriate: allergies, current medications, past family history, past medical history, past social history, past surgical history and problem list.    Problem List:  Patient Active Problem List   Diagnosis   • Hyperlipidemia   • Hypertension   • Coronary artery disease due to lipid rich plaque   • Anxiety disorder   • Carpal tunnel syndrome   • Encounter for general adult medical examination with abnormal findings   • History of prosthetic heart valve   • Obesity   • Encounter for screening for malignant neoplasm of colon   • Postmenopausal status   • Skin lesion   • Vitamin D deficiency   • White coat syndrome with diagnosis of hypertension   • Palpitations   • Dyslipidemia       Past Medical History:  Past Medical History:   Diagnosis Date   • Abdominal pain 02/23021   • Anesthesia     was told \"we almost lost you\" after anesthesia, but cannot further explain.  Was told she was given too much anesthesia   • Anxiety    • Borderline diabetes    • Constipation 02/2021   • Coronary artery disease due to lipid rich plaque 7/17/2019   • Coronary artery disease due to lipid rich plaque 7/17/2019   • Dyslipidemia " "6/10/2020   • Fibrocystic breast    • Hyperlipidemia    • Hypertension    • NSTEMI (non-ST elevated myocardial infarction) (HCC) 04/27/2018    Dr. Quinn told pt that she did not have NSEMI (but told she did by PCP)   • White coat syndrome with high blood pressure but without hypertension        Past Surgical History:  Past Surgical History:   Procedure Laterality Date   • CARDIAC CATHETERIZATION  04/28/2019    Mildly elevated left heart filling pressures, normal epicardial anatomy with no significant CAD   • CATARACT EXTRACTION, BILATERAL     • COLONOSCOPY N/A 2/17/2021    Procedure: COLONOSCOPY WITH POLYPECTOMY X7;  Surgeon: Bang Santamaria MD;  Location: [facility] ENDOSCOPY;  Service: Gastroenterology;  Laterality: N/A;  POLYPS, INTERNAL HEMORRHOIDS, MELANOSIS COLI   • D & C AND LAPAROSCOPY         Social History:  Social History     Socioeconomic History   • Marital status:    Tobacco Use   • Smoking status: Never Smoker   • Smokeless tobacco: Never Used   Vaping Use   • Vaping Use: Never used   Substance and Sexual Activity   • Alcohol use: No   • Drug use: No   • Sexual activity: Defer       Allergies:  Allergies   Allergen Reactions   • Dioxybenzone Shortness Of Breath   • Contrast Dye Arrhythmia   • Dapsone Other (See Comments)   • Amoxicillin Palpitations           Review of Symptoms:  Constitutional: Patient afebrile no chills or unexpected weight changes  Respiratory: No cough, no wheezing or dyspnea  Cardiovascular: Today the patient complains of no chest pain, palpitations, dyspnea, orthopnea and no edema  Gastrointestinal: No nausea, vomiting, constipation or diarrhea.  No melena or dark stools    All other systems reviewed and are negative           Objective:         /90   Pulse 84   Resp 18   Ht 172.7 cm (68\")   Wt 104 kg (230 lb)   SpO2 97%   BMI 34.97 kg/m²       Physical exam  Constitutional: well-nourished, and appears stated age in no acute distress  PERRL: Conjunctiva " clear, no pallor, anicteric  HENMT: normocephalic, normal dentition, no cyanosis or pallor  Neck:no bruits, or thrills and bilateral normal carotid upstroke. Normal jugular venous pressure  Cardiovascular: No parasternal heaves an non-displaced focal PMI. Normal rate and rhythm: no rub, gallop, murmur or click and normal S1 and S2; no lower or upper extremity edema.   Lungs: unlabored, no wheezing with no rales or rhonchi on auscultation.  Extremities: Warm, no clubbing, cyanosis. Full and equal peripheral pulses in extremities with no bruits appreciated.   Abdomen: soft, non-tender, non-distended  Musculoskeletal: no joint tenderness or swelling and no erythema  Skin: Warm and dry, non-erythematous   Neuro:alert and normal affect. Oriented to time, place and person.       In-Office Procedure(s):  Procedures    ASCVD RIsk Score::  The ASCVD Risk score (Jcarlosjude BARTON Jr., et al., 2013) failed to calculate for the following reasons:    The valid HDL cholesterol range is 20 to 100 mg/dL    The valid total cholesterol range is 130 to 320 mg/dL    Recent Radiology:  Imaging Results (Most Recent)     None          Lab Review:   No visits with results within 2 Month(s) from this visit.   Latest known visit with results is:   Lab on 10/11/2021   Component Date Value   • Apolipoprotein B 10/11/2021 141*   • Lipoprotein (a) 10/11/2021 33.4               Invalid input(s): ALKPO4                        Invalid input(s): LDLCALC                Assessment:          Diagnosis Plan   1. Coronary artery disease due to lipid rich plaque     2. Dyslipidemia     3. Palpitations     4. Primary hypertension            Plan:         1. Coronary artery disease due to lipid rich plaque  Clinically silent. Nonobstructive. Not atherogenic dyslipidemia.    2. Dyslipidemia  Not atherogenic dyslipidemia. Continue nutritional changes    3. Palpitations  Stable    4. Primary hypertension  Well-controlled on medical therapy         Ed Alvarez  MD Kai  03/02/22  .

## 2022-05-09 RX ORDER — AMLODIPINE BESYLATE 5 MG/1
TABLET ORAL
Qty: 90 TABLET | Refills: 0 | Status: SHIPPED | OUTPATIENT
Start: 2022-05-09 | End: 2022-07-22

## 2022-05-09 NOTE — TELEPHONE ENCOUNTER
PATIENT ONLY HAS 2 PILLS LEFT OF THE AMLODIPINE     PHARMACY: [facility] [address] [place] [address] - [address] [place] [address] [phone] [facility] [phone] FX    PATIENT CALL BACK #: [phone]

## 2022-06-29 ENCOUNTER — LAB (OUTPATIENT)
Dept: FAMILY MEDICINE CLINIC | Facility: CLINIC | Age: 72
End: 2022-06-29

## 2022-06-29 ENCOUNTER — OFFICE VISIT (OUTPATIENT)
Dept: FAMILY MEDICINE CLINIC | Facility: CLINIC | Age: 72
End: 2022-06-29

## 2022-06-29 VITALS
WEIGHT: 220 LBS | SYSTOLIC BLOOD PRESSURE: 148 MMHG | HEIGHT: 68 IN | DIASTOLIC BLOOD PRESSURE: 88 MMHG | OXYGEN SATURATION: 98 % | TEMPERATURE: 97.8 F | BODY MASS INDEX: 33.34 KG/M2 | HEART RATE: 93 BPM

## 2022-06-29 DIAGNOSIS — Z12.31 BREAST CANCER SCREENING BY MAMMOGRAM: ICD-10-CM

## 2022-06-29 DIAGNOSIS — E78.5 DYSLIPIDEMIA: ICD-10-CM

## 2022-06-29 DIAGNOSIS — Z00.00 ENCOUNTER FOR SUBSEQUENT ANNUAL WELLNESS VISIT (AWV) IN MEDICARE PATIENT: Primary | ICD-10-CM

## 2022-06-29 DIAGNOSIS — M79.605 LEFT LEG PAIN: ICD-10-CM

## 2022-06-29 DIAGNOSIS — I10 PRIMARY HYPERTENSION: ICD-10-CM

## 2022-06-29 DIAGNOSIS — H93.8X3 EAR FULLNESS, BILATERAL: ICD-10-CM

## 2022-06-29 LAB
ALBUMIN SERPL-MCNC: 4.3 G/DL (ref 3.5–5.2)
ALBUMIN/GLOB SERPL: 1.2 G/DL
ALP SERPL-CCNC: 80 U/L (ref 39–117)
ALT SERPL W P-5'-P-CCNC: 20 U/L (ref 1–33)
ANION GAP SERPL CALCULATED.3IONS-SCNC: 9.9 MMOL/L (ref 5–15)
AST SERPL-CCNC: 22 U/L (ref 1–32)
BILIRUB SERPL-MCNC: 0.4 MG/DL (ref 0–1.2)
BUN SERPL-MCNC: 18 MG/DL (ref 8–23)
BUN/CREAT SERPL: 24 (ref 7–25)
CALCIUM SPEC-SCNC: 9.4 MG/DL (ref 8.6–10.5)
CHLORIDE SERPL-SCNC: 103 MMOL/L (ref 98–107)
CHOLEST SERPL-MCNC: 418 MG/DL (ref 0–200)
CO2 SERPL-SCNC: 25.1 MMOL/L (ref 22–29)
CREAT SERPL-MCNC: 0.75 MG/DL (ref 0.57–1)
EGFRCR SERPLBLD CKD-EPI 2021: 84.7 ML/MIN/1.73
GLOBULIN UR ELPH-MCNC: 3.7 GM/DL
GLUCOSE SERPL-MCNC: 113 MG/DL (ref 65–99)
HDLC SERPL-MCNC: 100 MG/DL (ref 40–60)
LDLC SERPL CALC-MCNC: 309 MG/DL (ref 0–100)
LDLC/HDLC SERPL: 3.03 {RATIO}
POTASSIUM SERPL-SCNC: 4 MMOL/L (ref 3.5–5.2)
PROT SERPL-MCNC: 8 G/DL (ref 6–8.5)
SODIUM SERPL-SCNC: 138 MMOL/L (ref 136–145)
TRIGL SERPL-MCNC: 75 MG/DL (ref 0–150)
VLDLC SERPL-MCNC: 9 MG/DL (ref 5–40)

## 2022-06-29 PROCEDURE — 80061 LIPID PANEL: CPT | Performed by: FAMILY MEDICINE

## 2022-06-29 PROCEDURE — G0439 PPPS, SUBSEQ VISIT: HCPCS | Performed by: FAMILY MEDICINE

## 2022-06-29 PROCEDURE — 1159F MED LIST DOCD IN RCRD: CPT | Performed by: FAMILY MEDICINE

## 2022-06-29 PROCEDURE — 99212 OFFICE O/P EST SF 10 MIN: CPT | Performed by: FAMILY MEDICINE

## 2022-06-29 PROCEDURE — 36415 COLL VENOUS BLD VENIPUNCTURE: CPT | Performed by: FAMILY MEDICINE

## 2022-06-29 PROCEDURE — 1126F AMNT PAIN NOTED NONE PRSNT: CPT | Performed by: FAMILY MEDICINE

## 2022-06-29 PROCEDURE — 1170F FXNL STATUS ASSESSED: CPT | Performed by: FAMILY MEDICINE

## 2022-06-29 PROCEDURE — 80053 COMPREHEN METABOLIC PANEL: CPT | Performed by: FAMILY MEDICINE

## 2022-06-29 NOTE — PATIENT INSTRUCTIONS
"Keep working to lose weight through healthy eating and exercise.   Critical care medicine: Principles of diagnosis and management in the adult (4th ed., pp. 9544-6410). Alvarez.\"> Swann's anesthesia (8th ed., pp. 232-250). Alvarez.\">   Advance Directive    Advance directives are legal documents that allow you to make decisions about your health care and medical treatment in case you become unable to communicate for yourself. Advance directives let your wishes be known to family, friends, and health care providers.  Discussing and writing advance directives should happen over time rather than all at once. Advance directives can be changed and updated at any time. There are different types of advance directives, such as:  Medical power of .  Living will.  Do not resuscitate (DNR) order or do not attempt resuscitation (DNAR) order.  Health care proxy and medical power of   A health care proxy is also called a health care agent. This person is appointed to make medical decisions for you when you are unable to make decisions for yourself. Generally, people ask a trusted friend or family member to act as their proxy and represent their preferences. Make sure you have an agreement with your trusted person to act as your proxy. A proxy may have to make a medical decision on your behalf if your wishes are not known.  A medical power of , also called a durable power of  for health care, is a legal document that names your health care proxy. Depending on the laws in your state, the document may need to be:  Signed.  Notarized.  Dated.  Copied.  Witnessed.  Incorporated into your medical record.  You may also want to appoint a trusted person to manage your money in the event you are unable to do so. This is called a durable power of  for finances. It is a separate legal document from the durable power of  for health care. You may choose your health care proxy or someone " different to act as your agent in money matters.  If you do not appoint a proxy, or there is a concern that the proxy is not acting in your best interest, a court may appoint a guardian to act on your behalf.  Living will  A living will is a set of instructions that state your wishes about medical care when you cannot express them yourself. Health care providers should keep a copy of your living will in your medical record. You may want to give a copy to family members or friends. To alert caregivers in case of an emergency, you can place a card in your wallet to let them know that you have a living will and where they can find it. A living will is used if you become:  Terminally ill.  Disabled.  Unable to communicate or make decisions.  The following decisions should be included in your living will:  To use or not to use life support equipment, such as dialysis machines and breathing machines (ventilators).  Whether you want a DNR or DNAR order. This tells health care providers not to use cardiopulmonary resuscitation (CPR) if breathing or heartbeat stops.  To use or not to use tube feeding.  To be given or not to be given food and fluids.  Whether you want comfort (palliative) care when the goal becomes comfort rather than a cure.  Whether you want to donate your organs and tissues.  A living will does not give instructions for distributing your money and property if you should pass away.  DNR or DNAR  A DNR or DNAR order is a request not to have CPR in the event that your heart stops beating or you stop breathing. If a DNR or DNAR order has not been made and shared, a health care provider will try to help any patient whose heart has stopped or who has stopped breathing. If you plan to have surgery, talk with your health care provider about how your DNR or DNAR order will be followed if problems occur.  What if I do not have an advance directive?  Some states assign family decision makers to act on your behalf if  you do not have an advance directive. Each state has its own laws about advance directives. You may want to check with your health care provider, , or state representative about the laws in your state.  Summary  Advance directives are legal documents that allow you to make decisions about your health care and medical treatment in case you become unable to communicate for yourself.  The process of discussing and writing advance directives should happen over time. You can change and update advance directives at any time.  Advance directives may include a medical power of , a living will, and a DNR or DNAR order.  This information is not intended to replace advice given to you by your health care provider. Make sure you discuss any questions you have with your health care provider.  Document Revised: 09/21/2021 Document Reviewed: 09/21/2021  Elsevier Patient Education © 2021 [employer]

## 2022-06-29 NOTE — PROGRESS NOTES
The ABCs of the Annual Wellness Visit  Subsequent Medicare Wellness Visit    Chief Complaint   Patient presents with   • Medicare Wellness-subsequent     White coat. Bp 120s/60s   • Leg Pain   • Ear Fullness     bilateral      Subjective    History of Present Illness:  Bigg Spear is a 71 y.o. female who presents for a Subsequent Medicare Wellness Visit.  Also needs a follow up on bp and chol  C/O left leg pain that starts prox lat aspect of calf  3 months  Denies trauma  Very bad initially but better now  C/O ear fullness bilaterally  bp at home running 110 sys    The following portions of the patient's history were reviewed and   updated as appropriate: allergies, current medications, past family history, past medical history, past social history, past surgical history and problem list.    Compared to one year ago, the patient feels her physical   health is the same.    Compared to one year ago, the patient feels her mental   health is the same.    Recent Hospitalizations:  She was not admitted to the hospital during the last year.       Current Medical Providers:  Patient Care Team:  Stephanie Morris MD as PCP - General (Family Medicine)  Ed Quinn MD as Consulting Physician (Cardiology)  Jess Baez OD (Optometry)  Jarrell Guo MD as Consulting Physician (Ophthalmology)    Outpatient Medications Prior to Visit   Medication Sig Dispense Refill   • albuterol sulfate  (90 Base) MCG/ACT inhaler Inhale 2 puffs Every 4 (Four) Hours As Needed for Wheezing. 1 inhaler 1   • amLODIPine (NORVASC) 5 MG tablet Take 1 tablet by mouth once daily 90 tablet 0   • Garlic 1000 MG capsule Take 1 tablet by mouth Daily. Do not take dos     • Multiple Vitamins-Minerals (MULTIVITAMIN WITH MINERALS) tablet tablet Take 1 tablet by mouth Daily. Do not take dos     • NON FORMULARY Blood pressure formulary- has multiple supplements as ingredients  Do not take dos     • Omega-3 Fatty Acids  "(EQL OMEGA 3 FISH OIL) 1400 MG capsule Take 1 capsule by mouth 2 (Two) Times a Day.       No facility-administered medications prior to visit.       No opioid medication identified on active medication list. I have reviewed chart for other potential  high risk medication/s and harmful drug interactions in the elderly.          Aspirin is not on active medication list.  Aspirin use is not indicated based on review of current medical condition/s. Risk of harm outweighs potential benefits.  .    Patient Active Problem List   Diagnosis   • Hyperlipidemia   • Hypertension   • Coronary artery disease due to lipid rich plaque   • Anxiety disorder   • Carpal tunnel syndrome   • Encounter for subsequent annual wellness visit (AWV) in Medicare patient   • History of prosthetic heart valve   • Obesity   • Encounter for screening for malignant neoplasm of colon   • Postmenopausal status   • Skin lesion   • Vitamin D deficiency   • White coat syndrome with diagnosis of hypertension   • Palpitations   • Dyslipidemia     Advance Care Planning  Advance Directive is not on file.  ACP discussion was held with the patient during this visit. Patient does not have an advance directive, information provided.    Review of Systems   Constitutional: Negative.    HENT: Negative for congestion and sinus pressure.    Respiratory: Negative.    Cardiovascular: Negative.    Musculoskeletal: Positive for arthralgias. Negative for joint swelling and myalgias.   Neurological: Negative.    Psychiatric/Behavioral: Negative.         Objective    Vitals:    06/29/22 0848 06/29/22 0921   BP: 165/97 148/88   BP Location: Left arm    Patient Position: Sitting    Cuff Size: Large Adult    Pulse: 93    Temp: 97.8 °F (36.6 °C)    TempSrc: Temporal    SpO2: 98%    Weight: 99.8 kg (220 lb)    Height: 172.7 cm (68\")    PainSc: 0-No pain      Estimated body mass index is 33.45 kg/m² as calculated from the following:    Height as of this encounter: 172.7 cm " "(68\").    Weight as of this encounter: 99.8 kg (220 lb).    BMI is >= 30 and <35. (Class 1 Obesity). The following options were offered after discussion;: exercise counseling/recommendations      Does the patient have evidence of cognitive impairment? No   MMSE done 30/30    Physical Exam  Vitals and nursing note reviewed.   Constitutional:       General: She is not in acute distress.     Appearance: Normal appearance. She is well-developed and well-groomed. She is obese.   HENT:      Head: Normocephalic and atraumatic.      Right Ear: Tympanic membrane and ear canal normal.      Left Ear: Tympanic membrane and ear canal normal.      Nose:      Right Sinus: No maxillary sinus tenderness or frontal sinus tenderness.      Left Sinus: No maxillary sinus tenderness or frontal sinus tenderness.   Neck:      Thyroid: No thyromegaly.   Cardiovascular:      Rate and Rhythm: Normal rate and regular rhythm.      Heart sounds: Normal heart sounds. No murmur heard.    No friction rub. No gallop.   Pulmonary:      Effort: Pulmonary effort is normal. No respiratory distress.      Breath sounds: Normal breath sounds. No wheezing or rales.   Musculoskeletal:      Cervical back: Neck supple.      Left lower leg: Normal.      Comments: No calf tenderness, swelling, erythema, or warmth   Lymphadenopathy:      Cervical: No cervical adenopathy.   Skin:     General: Skin is warm and dry.   Neurological:      Mental Status: She is alert.   Psychiatric:         Behavior: Behavior is cooperative.                 HEALTH RISK ASSESSMENT    Smoking Status:  Social History     Tobacco Use   Smoking Status Never Smoker   Smokeless Tobacco Never Used     Alcohol Consumption:  Social History     Substance and Sexual Activity   Alcohol Use No     Fall Risk Screen:    STEADI Fall Risk Assessment was completed, and patient is at LOW risk for falls.Assessment completed on:6/29/2022    Depression Screening:  PHQ-2/PHQ-9 Depression Screening 6/29/2022 "   Retired PHQ-9 Total Score -   Retired Total Score -   Little Interest or Pleasure in Doing Things 0-->not at all   Feeling Down, Depressed or Hopeless 0-->not at all   PHQ-9: Brief Depression Severity Measure Score 0       Health Habits and Functional and Cognitive Screening:  Functional & Cognitive Status 6/29/2022   Do you have difficulty preparing food and eating? No   Do you have difficulty bathing yourself, getting dressed or grooming yourself? No   Do you have difficulty using the toilet? No   Do you have difficulty moving around from place to place? No   Do you have trouble with steps or getting out of a bed or a chair? No   Current Diet Well Balanced Diet   Dental Exam Not up to date   Eye Exam Up to date   Exercise (times per week) 7 times per week   Current Exercises Include Walking   Current Exercise Activities Include -   Do you need help using the phone?  No   Are you deaf or do you have serious difficulty hearing?  No   Do you need help with transportation? No   Do you need help shopping? No   Do you need help preparing meals?  No   Do you need help with housework?  No   Do you need help with laundry? No   Do you need help taking your medications? No   Do you need help managing money? No   Do you ever drive or ride in a car without wearing a seat belt? No   Have you felt unusual stress, anger or loneliness in the last month? No   Who do you live with? Spouse   If you need help, do you have trouble finding someone available to you? No   Have you been bothered in the last four weeks by sexual problems? No   Do you have difficulty concentrating, remembering or making decisions? No       Age-appropriate Screening Schedule:  Refer to the list below for future screening recommendations based on patient's age, sex and/or medical conditions. Orders for these recommended tests are listed in the plan section. The patient has been provided with a written plan.    Health Maintenance   Topic Date Due   • DXA SCAN   Never done   • TDAP/TD VACCINES (1 - Tdap) Never done   • LIPID PANEL  06/16/2022   • ZOSTER VACCINE (1 of 2) 06/28/2023 (Originally 11/2/1999)   • INFLUENZA VACCINE  10/01/2022   • MAMMOGRAM  07/26/2023              Assessment & Plan   CMS Preventative Services Quick Reference  Risk Factors Identified During Encounter  Immunizations Discussed/Encouraged (specific Immunizations; refuses all vaccines  The above risks/problems have been discussed with the patient.  Follow up actions/plans if indicated are seen below in the Assessment/Plan Section.  Pertinent information has been shared with the patient in the After Visit Summary.    Diagnoses and all orders for this visit:    1. Encounter for subsequent annual wellness visit (AWV) in Medicare patient (Primary)    2. Dyslipidemia  -     Comprehensive Metabolic Panel  -     Lipid Panel; Future    3. Primary hypertension  -     Comprehensive Metabolic Panel  -     Lipid Panel; Future    4. Breast cancer screening by mammogram  -     Mammo Screening Digital Tomosynthesis Bilateral With CAD; Future    5. Ear fullness, bilateral    6. Left leg pain        Follow Up:   Return in about 1 year (around 6/29/2023) for Recheck, Medicare Wellness.     An After Visit Summary and PPPS were made available to the patient.                 She is due mammogram and that was ordered.  She refuses all vaccines despite counseling.  Labs were ordered CMP and lipid  She was reassured on the ear fullness and on the leg pain.  I encouraged her to apply warm compresses to her left leg  I will see her back in a year  She was counseled on advanced directives and given a handout

## 2022-06-30 RX ORDER — ATORVASTATIN CALCIUM 10 MG/1
10 TABLET, FILM COATED ORAL DAILY
Qty: 90 TABLET | Refills: 3 | Status: SHIPPED | OUTPATIENT
Start: 2022-06-30

## 2022-07-22 RX ORDER — AMLODIPINE BESYLATE 5 MG/1
TABLET ORAL
Qty: 90 TABLET | Refills: 3 | Status: SHIPPED | OUTPATIENT
Start: 2022-07-22

## 2022-08-01 ENCOUNTER — HOSPITAL ENCOUNTER (OUTPATIENT)
Dept: MAMMOGRAPHY | Facility: HOSPITAL | Age: 72
Discharge: HOME | End: 2022-08-01
Admitting: FAMILY MEDICINE

## 2022-08-01 DIAGNOSIS — Z12.31 BREAST CANCER SCREENING BY MAMMOGRAM: ICD-10-CM

## 2022-08-01 PROCEDURE — 77063 BREAST TOMOSYNTHESIS BI: CPT

## 2022-08-01 PROCEDURE — 77067 SCR MAMMO BI INCL CAD: CPT

## 2022-10-21 ENCOUNTER — OFFICE VISIT (OUTPATIENT)
Dept: CARDIOLOGY | Facility: CLINIC | Age: 72
End: 2022-10-21

## 2022-10-21 VITALS
OXYGEN SATURATION: 97 % | HEIGHT: 68 IN | DIASTOLIC BLOOD PRESSURE: 94 MMHG | SYSTOLIC BLOOD PRESSURE: 190 MMHG | HEART RATE: 88 BPM | BODY MASS INDEX: 33.34 KG/M2 | WEIGHT: 220 LBS

## 2022-10-21 DIAGNOSIS — I51.7 LVH (LEFT VENTRICULAR HYPERTROPHY): Primary | ICD-10-CM

## 2022-10-21 PROCEDURE — 99204 OFFICE O/P NEW MOD 45 MIN: CPT | Performed by: INTERNAL MEDICINE

## 2022-10-21 PROCEDURE — 93000 ELECTROCARDIOGRAM COMPLETE: CPT | Performed by: INTERNAL MEDICINE

## 2022-10-21 NOTE — PROGRESS NOTES
Cardiology Clinic Note  Link Monzon MD, PhD    Subjective:     Encounter Date:10/21/2022      Patient ID: Bigg Spear is a 71 y.o. female.    Chief Complaint:  Chief Complaint   Patient presents with   • Establish Care   • Coronary Artery Disease       HPI:  I the pleasure to see this 71-year-old female with clinic today.  She has history of hypertension hyperlipidemia palpitations and nonobstructive CAD by heart cath in 2019.  She also was admitted to the hospital with hypertensive emergency with elevated troponin at that time but again did not represent atherothrombotic event.  Her EF has been normal.  There was some concern over some LVH which was hypertension related.  She has had hyperlipidemia with HDL that was very good at 103 with LDL of 244 and triglycerides of 82.  Her fractionated LDL with lipoprotein a and lipoprotein B was not performed.  This was performed by outside testing with lipoprotein a less than 75 and 47 which did not represent a high risk finding.  She has not been on statin therapy all this was prescribed by primary care.  Blood pressure is high today and she says she has whitecoat syndrome at 190/94 with heart rates in the 80s.  She is chest pain-free.  She is only on 5 mg of lisinopril a day.  Home blood pressure logs reviewed by me demonstrate anywhere from 1 15-1 45 systolic mostly with some outliers.  She denies any chest pain syncope palpitations or other complaints    EKG reviewed and interpreted by me today demonstrates sinus rhythm with possible LVH, nonspecific ST-T wave abnormalities in the lateral leads    Review of systems otherwise negative x14 point review of systems except as mentioned above  Historical data copied forward from previous encounters in EMR including the history, exam, and assessment/plan has been reviewed and is unchanged unless noted otherwise.    Cardiac medicines reviewed with risk, benefits, and necessity of each discussed.    Risk and benefit of  "cardiac testing reviewed including death heart attack stroke pain bleeding infection need for vascular /cardiovascular surgery were discussed and the patient     Objective:         BP (!) 190/94   Pulse 88   Ht 172.7 cm (68\")   Wt 99.8 kg (220 lb)   SpO2 97%   BMI 33.45 kg/m²     Physical Exam  Regular rate and rhythm  Faint benign 1 out of 6 systolic ejection murmur left sternal border  No clubbing cyanosis or edema  Radial pulses 2+ equal laterally  Normal cap refill  No carotid bruits or JVD  Tach grossly  Clear to auscultation bilaterally  Skin is warm and dry neck  Assessment:         Essential hypertension  LVH  History of hypertensive emergency  History of demand ischemia type II MI 2019  Preserved EF  No evidence of any heart failure  Hyperlipidemia, benign by fractionated LDL, not on statin therapy at this time or aspirin  Nonobstructive luminal regularities by heart cath 2019  Palpitations    Blood pressure goals are being met by home blood pressure log twice daily  Continue amlodipine  LVH, goal blood pressure less than 135 systolic  Will defer any statin therapy presently, can recheck yearly  Routine diet and exercise per AHA guidelines heart healthy Mediterranean diet was advised  Activity 3-4 times a week  Continue blood pressure logs    Back to clinic 6 months    The pleasure to be involved in this patient's cardiovascular care.  Please call with any questions or concerns  Link Monzon MD, PhD    Most recent EKG as reviewed and interpreted by me:    ECG 12 Lead    Date/Time: 10/21/2022 1:08 PM  Performed by: Link Monzon MD  Authorized by: Link Monzon MD   Rhythm: sinus rhythm  Rate: normal  QRS axis: normal  Other findings: non-specific ST-T wave changes and left ventricular hypertrophy             Most recent echo as reviewed and interpreted by me:      Most recent stress test as reviewed and interpreted by me:      Most recent cardiac catheterization as reviewed " "interpreted by me:  No results found for this or any previous visit.    The following portions of the patient's history were reviewed and updated as appropriate: allergies, current medications, past family history, past medical history, past social history, past surgical history and problem list.      ROS:  14 point review of systems negative except as mentioned above    Current Outpatient Medications:   •  albuterol sulfate  (90 Base) MCG/ACT inhaler, Inhale 2 puffs Every 4 (Four) Hours As Needed for Wheezing., Disp: 1 inhaler, Rfl: 1  •  amLODIPine (NORVASC) 5 MG tablet, Take 1 tablet by mouth once daily, Disp: 90 tablet, Rfl: 3  •  atorvastatin (LIPITOR) 10 MG tablet, Take 1 tablet by mouth Daily., Disp: 90 tablet, Rfl: 3  •  Garlic 1000 MG capsule, Take 1 tablet by mouth Daily. Do not take dos, Disp: , Rfl:   •  Multiple Vitamins-Minerals (MULTIVITAMIN WITH MINERALS) tablet tablet, Take 1 tablet by mouth Daily. Do not take dos, Disp: , Rfl:   •  NON FORMULARY, Blood pressure formulary- has multiple supplements as ingredients Do not take dos, Disp: , Rfl:   •  Omega-3 Fatty Acids (EQL OMEGA 3 FISH OIL) 1400 MG capsule, Take 1 capsule by mouth 2 (Two) Times a Day., Disp: , Rfl:     Problem List:  Patient Active Problem List   Diagnosis   • Mixed hyperlipidemia   • Coronary artery disease due to lipid rich plaque   • Anxiety disorder   • Carpal tunnel syndrome   • Encounter for subsequent annual wellness visit (AWV) in Medicare patient   • History of prosthetic heart valve   • Obesity   • Encounter for screening for malignant neoplasm of colon   • Postmenopausal status   • Skin lesion   • Vitamin D deficiency   • Benign essential HTN   • Palpitations   • Dyslipidemia     Past Medical History:  Past Medical History:   Diagnosis Date   • Abdominal pain 02/23021   • Anesthesia     was told \"we almost lost you\" after anesthesia, but cannot further explain.  Was told she was given too much anesthesia   • Anxiety  "   • Borderline diabetes    • Constipation 02/2021   • Coronary artery disease due to lipid rich plaque 7/17/2019   • Coronary artery disease due to lipid rich plaque 7/17/2019   • Dyslipidemia 6/10/2020   • Fibrocystic breast    • Hyperlipidemia    • Hypertension    • NSTEMI (non-ST elevated myocardial infarction) (HCC) 04/27/2018    Dr. Quinn told pt that she did not have NSEMI (but told she did by PCP)   • White coat syndrome with high blood pressure but without hypertension      Past Surgical History:  Past Surgical History:   Procedure Laterality Date   • CARDIAC CATHETERIZATION  04/28/2019    Mildly elevated left heart filling pressures, normal epicardial anatomy with no significant CAD   • CATARACT EXTRACTION, BILATERAL     • COLONOSCOPY N/A 2/17/2021    Procedure: COLONOSCOPY WITH POLYPECTOMY X7;  Surgeon: Bang Santamaria MD;  Location: [facility] ENDOSCOPY;  Service: Gastroenterology;  Laterality: N/A;  POLYPS, INTERNAL HEMORRHOIDS, MELANOSIS COLI   • D & C AND LAPAROSCOPY       Social History:  Social History     Socioeconomic History   • Marital status:    Tobacco Use   • Smoking status: Never   • Smokeless tobacco: Never   Vaping Use   • Vaping Use: Never used   Substance and Sexual Activity   • Alcohol use: No   • Drug use: No   • Sexual activity: Defer     Allergies:  Allergies   Allergen Reactions   • Dioxybenzone Shortness Of Breath   • Contrast Dye Arrhythmia   • Dapsone Other (See Comments)   • Amoxicillin Palpitations     Immunizations:    There is no immunization history on file for this patient.         In-Office Procedure(s):  No orders to display        ASCVD RIsk Score::  The ASCVD Risk score (Ashok ROMERO, et al., 2019) failed to calculate for the following reasons:    The valid total cholesterol range is 130 to 320 mg/dL    Imaging:                 Lab Review:   Lab on 06/29/2022   Component Date Value   • Total Cholesterol 06/29/2022 418 (H)    • Triglycerides 06/29/2022 75    •  HDL Cholesterol 06/29/2022 100 (H)    • LDL Cholesterol  06/29/2022 309 (H)    • VLDL Cholesterol 06/29/2022 9    • LDL/HDL Ratio 06/29/2022 3.03    Office Visit on 06/29/2022   Component Date Value   • Glucose 06/29/2022 113 (H)    • BUN 06/29/2022 18    • Creatinine 06/29/2022 0.75    • Sodium 06/29/2022 138    • Potassium 06/29/2022 4.0    • Chloride 06/29/2022 103    • CO2 06/29/2022 25.1    • Calcium 06/29/2022 9.4    • Total Protein 06/29/2022 8.0    • Albumin 06/29/2022 4.30    • ALT (SGPT) 06/29/2022 20    • AST (SGOT) 06/29/2022 22    • Alkaline Phosphatase 06/29/2022 80    • Total Bilirubin 06/29/2022 0.4    • Globulin 06/29/2022 3.7    • A/G Ratio 06/29/2022 1.2    • BUN/Creatinine Ratio 06/29/2022 24.0    • Anion Gap 06/29/2022 9.9    • eGFR 06/29/2022 84.7      Recent labs reviewed and interpreted for clinical significance and application            Level of Care:           Link Monzon MD  10/21/22  .

## 2022-11-22 ENCOUNTER — TELEPHONE (OUTPATIENT)
Dept: FAMILY MEDICINE CLINIC | Facility: CLINIC | Age: 72
End: 2022-11-22

## 2022-11-22 NOTE — TELEPHONE ENCOUNTER
[employer] MED[employer] ADVANTAGE PLAN HAS BEEN UPDATED, IF YOU WOULD LIKE TO ATTEND THE CALL NUMBER [phone] OPTION 3    WOULD LIKE ANY FEEDBACK     EMAIL [email or web address]

## 2023-04-27 ENCOUNTER — OFFICE VISIT (OUTPATIENT)
Dept: CARDIOLOGY | Facility: CLINIC | Age: 73
End: 2023-04-27
Payer: MEDICARE

## 2023-04-27 VITALS
RESPIRATION RATE: 18 BRPM | BODY MASS INDEX: 34.25 KG/M2 | SYSTOLIC BLOOD PRESSURE: 152 MMHG | DIASTOLIC BLOOD PRESSURE: 85 MMHG | HEIGHT: 68 IN | WEIGHT: 226 LBS | HEART RATE: 89 BPM

## 2023-04-27 DIAGNOSIS — I51.7 LVH (LEFT VENTRICULAR HYPERTROPHY): Primary | ICD-10-CM

## 2023-04-27 DIAGNOSIS — E78.5 DYSLIPIDEMIA: ICD-10-CM

## 2023-04-27 DIAGNOSIS — I25.83 CORONARY ARTERY DISEASE DUE TO LIPID RICH PLAQUE: ICD-10-CM

## 2023-04-27 DIAGNOSIS — I10 PRIMARY HYPERTENSION: ICD-10-CM

## 2023-04-27 DIAGNOSIS — I10 ESSENTIAL HYPERTENSION: ICD-10-CM

## 2023-04-27 DIAGNOSIS — I25.10 CORONARY ARTERY DISEASE DUE TO LIPID RICH PLAQUE: ICD-10-CM

## 2023-05-02 NOTE — PROGRESS NOTES
Cardiology Clinic Note  Link Monzon MD, PhD    Subjective:     Encounter Date:04/27/2023      Patient ID: Bigg Spear is a 72 y.o. female.    Chief Complaint:  Chief Complaint   Patient presents with   • Follow-up       HPI:    I the pleasure to see this 72-year-old female with clinic today.  She has history of hypertension hyperlipidemia palpitations and nonobstructive CAD by heart cath in 2019.  She also was admitted to the hospital with hypertensive emergency with elevated troponin at that time but again did not represent atherothrombotic event.  Her EF has been normal.  There was some concern over some LVH which was hypertension related.  She has had hyperlipidemia with HDL that was very good at 103 with LDL of 244 and triglycerides of 82. She has tried multiple statins with significant myalgias joint pains with significant intolerance in the past.  PCSK9 inhalers were discussed today as documented below. Blood pressure is high today and she says she has whitecoat syndrome at 190/94, improved 152/85 on this visit, at home says less than 140 systolic.   She is chest pain-free.   Home blood pressure logs reviewed by me demonstrate anywhere from 1 15-1 45 systolic mostly with some outliers.  She denies any chest pain syncope palpitations or other complaints     EKG reviewed and interpreted by me today demonstrates sinus rhythm with possible LVH, nonspecific ST-T wave abnormalities in the lateral leads     Review of systems otherwise negative x14 point review of systems except as mentioned above  Historical data copied forward from previous encounters in EMR including the history, exam, and assessment/plan has been reviewed and is unchanged unless noted otherwise.     Cardiac medicines reviewed with risk, benefits, and necessity of each discussed.     Risk and benefit of cardiac testing reviewed including death heart attack stroke pain bleeding infection need for vascular /cardiovascular surgery were  discussed and the patient      Objective:      Blood pressure somewhat better but still remains slightly elevated 152/85, heart rate 89, weight 226, at home her blood pressures are less than 140 systolic she  says on home logs provided     Physical Exam  Regular rate and rhythm  Faint benign 1 out of 6 systolic ejection murmur left sternal border  No clubbing cyanosis or edema  Radial pulses 2+ equal laterally  Normal cap refill  No carotid bruits or JVD  Tach grossly  Clear to auscultation bilaterally  Skin is warm and dry neck  Assessment:         Assessment          Essential hypertension  LVH  History of hypertensive emergency  History of demand ischemia type II MI 2019  Preserved EF  No evidence of any heart failure  Hyperlipidemia, benign by fractionated LDL, not on statin therapy at this time or aspirin  Nonobstructive luminal regularities by heart cath 2019  Palpitations     Blood pressure goals are being met by home blood pressure log twice daily  Continue amlodipine  LVH, goal blood pressure less than 135 systolic    Most recent lipid panel did not demonstrate any LDL greater than 2 8 however HDL was good at greater than 80, patient has had some multiple statin intolerance as torments with LDL well outside the normal and she  Would recommend initiation of lipid-lowering therapies  PCSK9 inhibitors are advised and are indicated in this scenario, inflammation of the artery, patient would like to think about starting this medication and discuss with primary care as well as her EAP which is reasonable    Routine diet and exercise per AHA guidelines heart healthy Mediterranean diet was advised  Activity 3-4 times a week  Continue blood pressure logs     Back to clinic 6 months     The pleasure to be involved in this patient's cardiovascular care.  Please call with any questions or concerns  Link Monzon MD, PhD         Historical data copied forward from previous encounters in EMR including the history, exam,  "and assessment/plan has been reviewed and is unchanged unless noted otherwise.    Cardiac medicines reviewed with risk, benefits, and necessity of each discussed.    Risk and benefit of cardiac testing reviewed including death heart attack stroke pain bleeding infection need for vascular /cardiovascular surgery were discussed and the patient     Objective:         /85 (BP Location: Left arm, Patient Position: Sitting)   Pulse 89   Resp 18   Ht 172.7 cm (68\")   Wt 103 kg (226 lb)   BMI 34.36 kg/m²       The pleasure to be involved in this patient's cardiovascular care.  Please call with any questions or concerns  Link Monzon MD, PhD    Most recent EKG as reviewed and interpreted by me:  Procedures     Most recent echo as reviewed and interpreted by me:      Most recent stress test as reviewed and interpreted by me:      Most recent cardiac catheterization as reviewed interpreted by me:  No results found for this or any previous visit.    The following portions of the patient's history were reviewed and updated as appropriate: allergies, current medications, past family history, past medical history, past social history, past surgical history and problem list.      ROS:  14 point review of systems negative except as mentioned above    Current Outpatient Medications:   •  albuterol sulfate  (90 Base) MCG/ACT inhaler, Inhale 2 puffs Every 4 (Four) Hours As Needed for Wheezing., Disp: 1 inhaler, Rfl: 1  •  amLODIPine (NORVASC) 5 MG tablet, Take 1 tablet by mouth once daily, Disp: 90 tablet, Rfl: 3  •  Garlic 1000 MG capsule, Take 1 capsule by mouth Daily. Do not take dos, Disp: , Rfl:   •  Multiple Vitamins-Minerals (MULTIVITAMIN WITH MINERALS) tablet tablet, Take 1 tablet by mouth Daily. Do not take dos, Disp: , Rfl:   •  NON FORMULARY, Blood pressure formulary- has multiple supplements as ingredients Do not take dos, Disp: , Rfl:   •  Omega-3 Fatty Acids (EQL OMEGA 3 FISH OIL) 1400 MG capsule, Take " "1 capsule by mouth 2 (Two) Times a Day., Disp: , Rfl:     Problem List:  Patient Active Problem List   Diagnosis   • Mixed hyperlipidemia   • Coronary artery disease due to lipid rich plaque   • Anxiety disorder   • Carpal tunnel syndrome   • Encounter for subsequent annual wellness visit (AWV) in Medicare patient   • History of prosthetic heart valve   • Obesity   • Encounter for screening for malignant neoplasm of colon   • Postmenopausal status   • Skin lesion   • Vitamin D deficiency   • Benign essential HTN   • Palpitations   • Dyslipidemia     Past Medical History:  Past Medical History:   Diagnosis Date   • Abdominal pain 02/23021   • Anesthesia     was told \"we almost lost you\" after anesthesia, but cannot further explain.  Was told she was given too much anesthesia   • Anxiety    • Borderline diabetes    • Constipation 02/2021   • Coronary artery disease due to lipid rich plaque 7/17/2019   • Coronary artery disease due to lipid rich plaque 7/17/2019   • Dyslipidemia 6/10/2020   • Fibrocystic breast    • Hyperlipidemia    • Hypertension    • NSTEMI (non-ST elevated myocardial infarction) 04/27/2018    Dr. Quinn told pt that she did not have NSEMI (but told she did by PCP)   • White coat syndrome with high blood pressure but without hypertension      Past Surgical History:  Past Surgical History:   Procedure Laterality Date   • CARDIAC CATHETERIZATION  04/28/2019    Mildly elevated left heart filling pressures, normal epicardial anatomy with no significant CAD   • CATARACT EXTRACTION, BILATERAL     • COLONOSCOPY N/A 2/17/2021    Procedure: COLONOSCOPY WITH POLYPECTOMY X7;  Surgeon: Bang Santamaria MD;  Location: [facility] ENDOSCOPY;  Service: Gastroenterology;  Laterality: N/A;  POLYPS, INTERNAL HEMORRHOIDS, MELANOSIS COLI   • D & C AND LAPAROSCOPY       Social History:  Social History     Socioeconomic History   • Marital status:    Tobacco Use   • Smoking status: Never   • Smokeless tobacco: " Never   Vaping Use   • Vaping Use: Never used   Substance and Sexual Activity   • Alcohol use: No   • Drug use: No   • Sexual activity: Defer     Allergies:  Allergies   Allergen Reactions   • Dioxybenzone Shortness Of Breath   • Contrast Dye (Echo Or Unknown Ct/Mr) Arrhythmia   • Dapsone Other (See Comments)   • Amoxicillin Palpitations     Immunizations:    There is no immunization history on file for this patient.         In-Office Procedure(s):  No orders to display        ASCVD RIsk Score::  The ASCVD Risk score (Ashok DK, et al., 2019) failed to calculate for the following reasons:    The valid total cholesterol range is 130 to 320 mg/dL    Imaging:                 Lab Review:   No visits with results within 6 Month(s) from this visit.   Latest known visit with results is:   Lab on 06/29/2022   Component Date Value   • Total Cholesterol 06/29/2022 418 (H)    • Triglycerides 06/29/2022 75    • HDL Cholesterol 06/29/2022 100 (H)    • LDL Cholesterol  06/29/2022 309 (H)    • VLDL Cholesterol 06/29/2022 9    • LDL/HDL Ratio 06/29/2022 3.03      Recent labs reviewed and interpreted for clinical significance and application            Level of Care:           Link Monzon MD  05/02/23  .

## 2023-08-07 RX ORDER — AMLODIPINE BESYLATE 5 MG/1
TABLET ORAL
Qty: 90 TABLET | Refills: 0 | Status: SHIPPED | OUTPATIENT
Start: 2023-08-07

## 2023-08-08 ENCOUNTER — HOSPITAL ENCOUNTER (OUTPATIENT)
Dept: BONE DENSITY | Facility: HOSPITAL | Age: 73
Discharge: HOME | End: 2023-08-08
Payer: MEDICARE

## 2023-08-08 ENCOUNTER — HOSPITAL ENCOUNTER (OUTPATIENT)
Dept: MAMMOGRAPHY | Facility: HOSPITAL | Age: 73
Discharge: HOME | End: 2023-08-08
Payer: MEDICARE

## 2023-08-08 DIAGNOSIS — Z12.31 BREAST CANCER SCREENING BY MAMMOGRAM: ICD-10-CM

## 2023-08-08 DIAGNOSIS — Z78.0 POSTMENOPAUSAL STATUS: ICD-10-CM

## 2023-08-08 PROCEDURE — 77067 SCR MAMMO BI INCL CAD: CPT

## 2023-08-08 PROCEDURE — 77080 DXA BONE DENSITY AXIAL: CPT

## 2023-08-08 PROCEDURE — 77063 BREAST TOMOSYNTHESIS BI: CPT

## 2023-08-09 NOTE — PROGRESS NOTES
Patient notified of test results. No questions from patient. Understanding results verified and confirm by patient or and PHI contact in chart.

## 2023-10-30 RX ORDER — AMLODIPINE BESYLATE 5 MG/1
TABLET ORAL
Qty: 90 TABLET | Refills: 0 | Status: SHIPPED | OUTPATIENT
Start: 2023-10-30

## 2023-10-31 ENCOUNTER — OFFICE VISIT (OUTPATIENT)
Dept: CARDIOLOGY | Facility: CLINIC | Age: 73
End: 2023-10-31
Payer: MEDICARE

## 2023-10-31 VITALS
WEIGHT: 213 LBS | DIASTOLIC BLOOD PRESSURE: 85 MMHG | RESPIRATION RATE: 18 BRPM | HEIGHT: 68 IN | BODY MASS INDEX: 32.28 KG/M2 | SYSTOLIC BLOOD PRESSURE: 159 MMHG | HEART RATE: 83 BPM

## 2023-10-31 DIAGNOSIS — E78.2 MIXED HYPERLIPIDEMIA: ICD-10-CM

## 2023-10-31 DIAGNOSIS — I25.83 CORONARY ARTERY DISEASE DUE TO LIPID RICH PLAQUE: ICD-10-CM

## 2023-10-31 DIAGNOSIS — I25.10 CORONARY ARTERY DISEASE DUE TO LIPID RICH PLAQUE: ICD-10-CM

## 2023-10-31 DIAGNOSIS — I10 ESSENTIAL HYPERTENSION: ICD-10-CM

## 2023-10-31 DIAGNOSIS — I51.7 LVH (LEFT VENTRICULAR HYPERTROPHY): Primary | ICD-10-CM

## 2023-10-31 DIAGNOSIS — E78.5 DYSLIPIDEMIA: ICD-10-CM

## 2023-11-08 NOTE — PROGRESS NOTES
Cardiology Clinic Note  Link Monzon MD, PhD    Subjective:     Encounter Date:10/31/2023      Patient ID: Bigg Spear is a 73 y.o. female.    Chief Complaint:  Chief Complaint   Patient presents with    Follow-up       HPI:      I the pleasure to see this 73-year-old female with clinic today.  She has history of hypertension hyperlipidemia palpitations and nonobstructive CAD by heart cath in 2019.  She also was admitted to the hospital with hypertensive emergency with elevated troponin at that time but again did not represent atherothrombotic event.  Her EF has been normal.  She has no clinical heart failure.  There was some concern over some LVH which was hypertension related.  She has had hyperlipidemia with HDL that was very good at 103 with LDL of 244 and triglycerides of 82.  Repeats as documented below were also abnormal with LDL of 298 HDL of 91 total cholesterol of 407 in July 2023 with recommendations for PCSK9 inhibitors with multiple statins tried but intolerant severely.  We also marked this on prior encounters, she has tried multiple statins with significant myalgias joint pains with significant intolerance in the past.  PCSK9 inhalers were discussed today as documented below. Blood pressure is high today and she says she has whitecoat syndrome at as high as 190 systolic in the past and is better controlled at home down in the 130s systolic reportedly.  She is chest pain-free.   Home blood pressure logs reviewed by me demonstrate anywhere from 1 15-1 45 systolic mostly with some outliers.  She denies any chest pain syncope palpitations or other complaints     EKG reviewed and interpreted by me today demonstrates sinus rhythm with possible LVH, nonspecific ST-T wave abnormalities in the lateral leads     Review of systems otherwise negative x14 point review of systems except as mentioned above  Historical data copied forward from previous encounters in EMR including the history, exam, and  assessment/plan has been reviewed and is unchanged unless noted otherwise.     Cardiac medicines reviewed with risk, benefits, and necessity of each discussed.     Risk and benefit of cardiac testing reviewed including death heart attack stroke pain bleeding infection need for vascular /cardiovascular surgery were discussed and the patient      Objective:      Blood pressure somewhat better but still remains slightly elevated 152/85, heart rate 89, weight 226, at home her blood pressures are less than 140 systolic she  says on home logs provided      Physical Exam  Regular rate and rhythm  Faint benign 1 out of 6 systolic ejection murmur left sternal border  No clubbing cyanosis or edema  Radial pulses 2+ equal laterally  Normal cap refill  No carotid bruits or JVD  Tach grossly  Clear to auscultation bilaterally  Skin is warm and dry neck  Assessment:         Assessment          Essential hypertension  LVH  History of hypertensive emergency  History of demand ischemia type II MI 2019  Preserved EF  No evidence of any heart failure  Hyperlipidemia, benign by fractionated LDL, not on statin therapy at this time or aspirin  Nonobstructive luminal regularities by heart cath 2019  Palpitations     Blood pressure goals are being met by home blood pressure log twice daily  Continue amlodipine  LVH, goal blood pressure less than 135 systolic     Most recent lipid panel with severely elevated LDL in July 2023 at 298 with total cholesterol of 407  However HDL was good at greater than 80, patient has had attempts at multiple statin intolerance   Would recommend initiation of lipid-lowering therapies  PCSK9 inhibitors are indicated in her clinical scenario.  Work-up for Repatha, information provided, her and her  would like to repeat studies with better control of diet and decide at later date.     Routine diet and exercise per AHA guidelines heart healthy Mediterranean diet was advised  Activity 3-4 times a  "week  Continue blood pressure logs     Back to clinic 6 months      Objective:         /85 (BP Location: Left arm, Patient Position: Sitting)   Pulse 83   Resp 18   Ht 172.7 cm (68\")   Wt 96.6 kg (213 lb)   BMI 32.39 kg/m²         The pleasure to be involved in this patient's cardiovascular care.  Please call with any questions or concerns  Link Monzon MD, PhD    Most recent EKG as reviewed and interpreted by me:  Procedures     Most recent echo as reviewed and interpreted by me:      Most recent stress test as reviewed and interpreted by me:      Most recent cardiac catheterization as reviewed interpreted by me:  No results found for this or any previous visit.    The following portions of the patient's history were reviewed and updated as appropriate: allergies, current medications, past family history, past medical history, past social history, past surgical history, and problem list.      ROS:  14 point review of systems negative except as mentioned above    Current Outpatient Medications:     amLODIPine (NORVASC) 5 MG tablet, Take 1 tablet by mouth once daily, Disp: 90 tablet, Rfl: 0    Multiple Vitamins-Minerals (MULTIVITAMIN WITH MINERALS) tablet tablet, Take 1 tablet by mouth Daily. Do not take dos, Disp: , Rfl:     NON FORMULARY, Blood pressure formulary- has multiple supplements as ingredients Do not take dos, Disp: , Rfl:     Omega-3 Fatty Acids (EQL OMEGA 3 FISH OIL) 1400 MG capsule, Take 1 capsule by mouth 2 (Two) Times a Day., Disp: , Rfl:     albuterol sulfate  (90 Base) MCG/ACT inhaler, Inhale 2 puffs Every 4 (Four) Hours As Needed for Wheezing. (Patient not taking: Reported on 7/6/2023), Disp: 1 inhaler, Rfl: 1    Garlic 1000 MG capsule, Take 1 capsule by mouth Daily. Do not take dos (Patient not taking: Reported on 10/31/2023), Disp: , Rfl:     Problem List:  Patient Active Problem List   Diagnosis    Mixed hyperlipidemia    Coronary artery disease due to lipid rich plaque    " "Anxiety disorder    Carpal tunnel syndrome    Encounter for subsequent annual wellness visit (AWV) in Medicare patient    History of prosthetic heart valve    Obesity    Encounter for screening for malignant neoplasm of colon    Postmenopausal status    Skin lesion    Vitamin D deficiency    Benign essential HTN    Palpitations    Dyslipidemia     Past Medical History:  Past Medical History:   Diagnosis Date    Abdominal pain 02/23021    Anesthesia     was told \"we almost lost you\" after anesthesia, but cannot further explain.  Was told she was given too much anesthesia    Anxiety     Borderline diabetes     Constipation 02/2021    Coronary artery disease due to lipid rich plaque 7/17/2019    Coronary artery disease due to lipid rich plaque 7/17/2019    Dyslipidemia 6/10/2020    Fibrocystic breast     Hyperlipidemia     Hypertension     NSTEMI (non-ST elevated myocardial infarction) 04/27/2018    Dr. Quinn told pt that she did not have NSEMI (but told she did by PCP)    White coat syndrome with high blood pressure but without hypertension      Past Surgical History:  Past Surgical History:   Procedure Laterality Date    CARDIAC CATHETERIZATION  04/28/2019    Mildly elevated left heart filling pressures, normal epicardial anatomy with no significant CAD    CATARACT EXTRACTION, BILATERAL      COLONOSCOPY N/A 2/17/2021    Procedure: COLONOSCOPY WITH POLYPECTOMY X7;  Surgeon: Bang Santamaria MD;  Location: [facility] ENDOSCOPY;  Service: Gastroenterology;  Laterality: N/A;  POLYPS, INTERNAL HEMORRHOIDS, MELANOSIS COLI    D & C AND LAPAROSCOPY       Social History:  Social History     Socioeconomic History    Marital status:    Tobacco Use    Smoking status: Never     Passive exposure: Never    Smokeless tobacco: Never   Vaping Use    Vaping Use: Never used   Substance and Sexual Activity    Alcohol use: No    Drug use: No    Sexual activity: Defer     Allergies:  Allergies   Allergen Reactions    " Dioxybenzone Shortness Of Breath    Contrast Dye (Echo Or Unknown Ct/Mr) Arrhythmia    Dapsone Other (See Comments)    Doxycycline Unknown - High Severity    Amoxicillin Palpitations     Immunizations:    There is no immunization history on file for this patient.         In-Office Procedure(s):  No orders to display        ASCVD RIsk Score::  The ASCVD Risk score (Ashok ROMERO, et al., 2019) failed to calculate for the following reasons:    The valid total cholesterol range is 130 to 320 mg/dL    Imaging:                 Lab Review:   Lab on 07/06/2023   Component Date Value    Glucose 07/06/2023 100 (H)     BUN 07/06/2023 14     Creatinine 07/06/2023 0.80     Sodium 07/06/2023 141     Potassium 07/06/2023 4.0     Chloride 07/06/2023 104     CO2 07/06/2023 24.1     Calcium 07/06/2023 9.9     Total Protein 07/06/2023 7.7     Albumin 07/06/2023 4.6     ALT (SGPT) 07/06/2023 18     AST (SGOT) 07/06/2023 18     Alkaline Phosphatase 07/06/2023 76     Total Bilirubin 07/06/2023 0.4     Globulin 07/06/2023 3.1     A/G Ratio 07/06/2023 1.5     BUN/Creatinine Ratio 07/06/2023 17.5     Anion Gap 07/06/2023 12.9     eGFR 07/06/2023 77.9     Total Cholesterol 07/06/2023 407 (H)     Triglycerides 07/06/2023 111     HDL Cholesterol 07/06/2023 91 (H)     LDL Cholesterol  07/06/2023 298 (H)     VLDL Cholesterol 07/06/2023 18     LDL/HDL Ratio 07/06/2023 3.23     25 Hydroxy, Vitamin D 07/06/2023 63.0     WBC 07/06/2023 3.59     RBC 07/06/2023 5.30 (H)     Hemoglobin 07/06/2023 13.7     Hematocrit 07/06/2023 42.9     MCV 07/06/2023 80.9     MCH 07/06/2023 25.8 (L)     MCHC 07/06/2023 31.9     RDW 07/06/2023 12.9     RDW-SD 07/06/2023 37.3     MPV 07/06/2023 11.1     Platelets 07/06/2023 194     Neutrophil % 07/06/2023 42.5 (L)     Lymphocyte % 07/06/2023 44.0     Monocyte % 07/06/2023 10.9     Eosinophil % 07/06/2023 1.4     Basophil % 07/06/2023 0.6     Immature Grans % 07/06/2023 0.6 (H)     Neutrophils, Absolute 07/06/2023 1.53 (L)      Lymphocytes, Absolute 07/06/2023 1.58     Monocytes, Absolute 07/06/2023 0.39     Eosinophils, Absolute 07/06/2023 0.05     Basophils, Absolute 07/06/2023 0.02     Immature Grans, Absolute 07/06/2023 0.02     nRBC 07/06/2023 0.0      Recent labs reviewed and interpreted for clinical significance and application            Level of Care:           Link Monzon MD  11/08/23  .

## 2024-01-24 ENCOUNTER — OFFICE VISIT (OUTPATIENT)
Dept: FAMILY MEDICINE CLINIC | Facility: CLINIC | Age: 74
End: 2024-01-24
Payer: MEDICARE

## 2024-01-24 VITALS
HEIGHT: 68 IN | HEART RATE: 84 BPM | TEMPERATURE: 98.2 F | BODY MASS INDEX: 32.74 KG/M2 | OXYGEN SATURATION: 100 % | SYSTOLIC BLOOD PRESSURE: 161 MMHG | WEIGHT: 216 LBS | DIASTOLIC BLOOD PRESSURE: 81 MMHG

## 2024-01-24 DIAGNOSIS — R21 RASH: Primary | ICD-10-CM

## 2024-01-24 PROCEDURE — 1159F MED LIST DOCD IN RCRD: CPT | Performed by: FAMILY MEDICINE

## 2024-01-24 PROCEDURE — 1160F RVW MEDS BY RX/DR IN RCRD: CPT | Performed by: FAMILY MEDICINE

## 2024-01-24 PROCEDURE — 3077F SYST BP >= 140 MM HG: CPT | Performed by: FAMILY MEDICINE

## 2024-01-24 PROCEDURE — 3079F DIAST BP 80-89 MM HG: CPT | Performed by: FAMILY MEDICINE

## 2024-01-24 PROCEDURE — 99213 OFFICE O/P EST LOW 20 MIN: CPT | Performed by: FAMILY MEDICINE

## 2024-01-24 NOTE — PROGRESS NOTES
"Subjective   Bigg MABLE Spear is a 73 y.o. female.     History of Present Illness  Here with c/o rash that is spreading         Ms. Spear is a 73-year-old female who presents today complaining of a rash.    The patient complains of a rash on her bilateral legs, abdomen, chest, and breast, which began about a week ago. The rash is pruritic and is dark in color. She denies any swelling of her tongue or trouble swallowing. She has been taking Benadryl, which has improved the rash.    The patient recently had her teeth extracted, and she was given some Fixodent. The rash appeared right after she started taking the Fixodent. Her lips were swollen, but she did not think about it until about it was almost over with. She went back to the foods that she ate, and she could not figure it out. The tiffanie relined them with some \"stuff,\" and he told her to go ahead and put that in her mouth. A few days after that, the rash really started. On that Sunday before she went to [employer], she put the \"stuff\" in because she wanted her teeth stay in because she did not want any movement and that is when the rash really started. The Benadryl has helped it start \"clearing\" up. She has also used Benadryl cream.      The following portions of the patient's history were reviewed and updated as appropriate: allergies, current medications, past family history, past medical history, past social history, past surgical history, and problem list.  Past Medical History:   Diagnosis Date    Abdominal pain 02/23021    Anesthesia     was told \"we almost lost you\" after anesthesia, but cannot further explain.  Was told she was given too much anesthesia    Anxiety     Borderline diabetes     Constipation 02/2021    Coronary artery disease due to lipid rich plaque 07/17/2019    Coronary artery disease due to lipid rich plaque 07/17/2019    Dyslipidemia 06/10/2020    Encounter for screening for malignant neoplasm of colon 08/26/2015    Fibrocystic breast     " Hyperlipidemia     Hypertension     NSTEMI (non-ST elevated myocardial infarction) 04/27/2018    Dr. Quinn told pt that she did not have NSEMI (but told she did by PCP)    White coat syndrome with high blood pressure but without hypertension      Past Surgical History:   Procedure Laterality Date    CARDIAC CATHETERIZATION  04/28/2019    Mildly elevated left heart filling pressures, normal epicardial anatomy with no significant CAD    CATARACT EXTRACTION, BILATERAL      COLONOSCOPY N/A 2/17/2021    Procedure: COLONOSCOPY WITH POLYPECTOMY X7;  Surgeon: Bang Santamaria MD;  Location: [facility] ENDOSCOPY;  Service: Gastroenterology;  Laterality: N/A;  POLYPS, INTERNAL HEMORRHOIDS, MELANOSIS COLI    D & C AND LAPAROSCOPY       Family History   Problem Relation Age of Onset    No Known Problems Mother     No Known Problems Father     No Known Problems Sister     Colon cancer Brother     No Known Problems Daughter     No Known Problems Son     No Known Problems Maternal Grandmother     No Known Problems Paternal Grandmother     No Known Problems Maternal Aunt     No Known Problems Paternal Aunt      Social History     Socioeconomic History    Marital status:    Tobacco Use    Smoking status: Never     Passive exposure: Never    Smokeless tobacco: Never   Vaping Use    Vaping Use: Never used   Substance and Sexual Activity    Alcohol use: No    Drug use: No    Sexual activity: Defer         Current Outpatient Medications:     albuterol sulfate  (90 Base) MCG/ACT inhaler, Inhale 2 puffs Every 4 (Four) Hours As Needed for Wheezing., Disp: 1 inhaler, Rfl: 1    amLODIPine (NORVASC) 5 MG tablet, Take 1 tablet by mouth once daily, Disp: 90 tablet, Rfl: 0    Garlic 1000 MG capsule, Take 1 capsule by mouth Daily. Do not take dos, Disp: , Rfl:     Multiple Vitamins-Minerals (MULTIVITAMIN WITH MINERALS) tablet tablet, Take 1 tablet by mouth Daily. Do not take dos, Disp: , Rfl:     NON FORMULARY, Blood pressure  "formulary- has multiple supplements as ingredients Do not take dos, Disp: , Rfl:     Omega-3 Fatty Acids (EQL OMEGA 3 FISH OIL) 1400 MG capsule, Take 1 capsule by mouth 2 (Two) Times a Day., Disp: , Rfl:     Review of Systems  /81 (BP Location: Left arm, Patient Position: Sitting, Cuff Size: Large Adult)   Pulse 84   Temp 98.2 °F (36.8 °C) (Temporal)   Ht 172.7 cm (68\")   Wt 98 kg (216 lb)   SpO2 100%   BMI 32.84 kg/m²    A review of systems was performed, and the pertinent positives are noted in the HPI.         Objective   Physical Exam  Vitals and nursing note reviewed.   Constitutional:       Appearance: Normal appearance. She is obese.   Cardiovascular:      Rate and Rhythm: Normal rate and regular rhythm.      Heart sounds: Normal heart sounds.   Pulmonary:      Effort: Pulmonary effort is normal.      Breath sounds: Normal breath sounds.   Musculoskeletal:      Cervical back: Neck supple.   Lymphadenopathy:      Cervical: No cervical adenopathy.   Skin:     Findings: No rash.   Neurological:      Mental Status: She is alert.      Cranial Nerves: No dysarthria.           Assessment & Plan   Problems Addressed this Visit    None  Visit Diagnoses       Rash    -  Primary          Diagnoses         Codes Comments    Rash    -  Primary ICD-10-CM: R21  ICD-9-CM: 782.1             1. Rash  - It appears to have completely resolved at this point. She has been taking Benadryl. She was reassured. We talked about the unusual presentation with regards to the rash being on her legs predominantly and her chest as opposed to involving her mouth and gums. The only facial issue she had was some mild lip swelling. She had been eating a lot of salmon during that same time period because it was soft, but she has had no problems with it and she has eaten it since. I talked to her about maybe doing a test with the Fixodent in about 2 weeks and having Benadryl ready and having her family aware that she is going to do it " and see if the rash recurs. If not, the reaction may have been to something completely different such as any type of anesthesia that would have been used whenever her teeth were pulled. The patient voiced understanding. The pictures that she showed of the rash were pretty impressive and was completely solid, reddish, purple appearance all the way from her knees down to her ankles.       Transcribed from ambient dictation for Stephanie Morris MD by Lynda Hester.  01/24/24   09:15 EST    Patient or patient representative verbalized consent to the visit recording.  I have personally performed the services described in this document as transcribed by the above individual, and it is both accurate and complete.

## 2024-02-01 RX ORDER — AMLODIPINE BESYLATE 5 MG/1
TABLET ORAL
Qty: 90 TABLET | Refills: 0 | Status: SHIPPED | OUTPATIENT
Start: 2024-02-01

## 2024-04-25 RX ORDER — AMLODIPINE BESYLATE 5 MG/1
TABLET ORAL
Qty: 90 TABLET | Refills: 0 | Status: SHIPPED | OUTPATIENT
Start: 2024-04-25

## 2024-07-09 NOTE — PROGRESS NOTES
The ABCs of the Annual Wellness Visit  Subsequent Medicare Wellness Visit    Subjective    Bigg Spear is a 73 y.o. female who presents for a Subsequent Medicare Wellness Visit.    The following portions of the patient's history were reviewed and   updated as appropriate: allergies, current medications, past family history, past medical history, past social history, past surgical history, and problem list.    Compared to one year ago, the patient feels her physical   health is the same.    Compared to one year ago, the patient feels her mental   health is the same.    Recent Hospitalizations:  She was not admitted to the hospital during the last year.       Current Medical Providers:  Patient Care Team:  Stephanie Morris MD as PCP - General (Family Medicine)  Jess Baez OD (Optometry)  Jarrell Guo MD as Consulting Physician (Ophthalmology)  Link Monzon MD as Consulting Physician (Cardiology)    Outpatient Medications Prior to Visit   Medication Sig Dispense Refill    Multiple Vitamins-Minerals (MULTIVITAMIN WITH MINERALS) tablet tablet Take 1 tablet by mouth Daily. Do not take dos      NON FORMULARY Blood pressure formulary- has multiple supplements as ingredients  Do not take dos      Omega-3 Fatty Acids (EQL OMEGA 3 FISH OIL) 1400 MG capsule Take 1 capsule by mouth 2 (Two) Times a Day.      amLODIPine (NORVASC) 5 MG tablet Take 1 tablet by mouth once daily 90 tablet 0    Garlic 1000 MG capsule Take 1 capsule by mouth Daily. Do not take dos (Patient not taking: Reported on 7/10/2024)      albuterol sulfate  (90 Base) MCG/ACT inhaler Inhale 2 puffs Every 4 (Four) Hours As Needed for Wheezing. (Patient not taking: Reported on 7/10/2024) 1 inhaler 1     No facility-administered medications prior to visit.       No opioid medication identified on active medication list. I have reviewed chart for other potential  high risk medication/s and harmful drug interactions in the  "elderly.        Aspirin is not on active medication list.  Aspirin use is not indicated based on review of current medical condition/s. Risk of harm outweighs potential benefits.  .    Patient Active Problem List   Diagnosis    Mixed hyperlipidemia    Coronary artery disease due to lipid rich plaque    Anxiety disorder    Carpal tunnel syndrome    Encounter for subsequent annual wellness visit (AWV) in Medicare patient    History of prosthetic heart valve    Obesity    Encounter for screening for malignant neoplasm of colon    Postmenopausal status    Skin lesion    Vitamin D deficiency    Benign essential HTN    Palpitations    Dyslipidemia     Advance Care Planning   Advance Care Planning     Advance Directive is not on file.  ACP discussion was held with the patient during this visit. Patient does not have an advance directive, information provided.     Objective    Vitals:    07/10/24 0852 07/10/24 0917   BP: 178/88 150/68   BP Location: Left arm    Patient Position: Sitting    Cuff Size: Large Adult    Pulse: 86    Temp: 97.3 °F (36.3 °C)    TempSrc: Temporal    SpO2: 98%    Weight: 100 kg (221 lb)    Height: 172.7 cm (68\")    PainSc: 0-No pain      Estimated body mass index is 33.6 kg/m² as calculated from the following:    Height as of this encounter: 172.7 cm (68\").    Weight as of this encounter: 100 kg (221 lb).    BMI is >= 30 and <35. (Class 1 Obesity). The following options were offered after discussion;: exercise counseling/recommendations      Does the patient have evidence of cognitive impairment? No  MMSE DONE 30/30        HEALTH RISK ASSESSMENT    Smoking Status:  Social History     Tobacco Use   Smoking Status Never    Passive exposure: Never   Smokeless Tobacco Never     Alcohol Consumption:  Social History     Substance and Sexual Activity   Alcohol Use No     Fall Risk Screen:    STEADI Fall Risk Assessment was completed, and patient is at LOW risk for falls.Assessment completed " on:7/10/2024    Depression Screenin/10/2024     8:54 AM   PHQ-2/PHQ-9 Depression Screening   Little Interest or Pleasure in Doing Things 0-->not at all   Feeling Down, Depressed or Hopeless 0-->not at all   PHQ-9: Brief Depression Severity Measure Score 0       Health Habits and Functional and Cognitive Screenin/10/2024     8:54 AM   Functional & Cognitive Status   Do you have difficulty preparing food and eating? No   Do you have difficulty bathing yourself, getting dressed or grooming yourself? No   Do you have difficulty using the toilet? No   Do you have difficulty moving around from place to place? No   Do you have trouble with steps or getting out of a bed or a chair? No   Current Diet Well Balanced Diet   Dental Exam Up to date   Eye Exam Up to date   Exercise (times per week) 5 times per week   Current Exercises Include Walking   Do you need help using the phone?  No   Are you deaf or do you have serious difficulty hearing?  No   Do you need help to go to places out of walking distance? No   Do you need help shopping? No   Do you need help preparing meals?  No   Do you need help with housework?  No   Do you need help with laundry? No   Do you need help taking your medications? No   Do you need help managing money? No   Do you ever drive or ride in a car without wearing a seat belt? No   Have you felt unusual stress, anger or loneliness in the last month? No   Who do you live with? Spouse   If you need help, do you have trouble finding someone available to you? No   Have you been bothered in the last four weeks by sexual problems? No   Do you have difficulty concentrating, remembering or making decisions? No       Age-appropriate Screening Schedule:  Refer to the list below for future screening recommendations based on patient's age, sex and/or medical conditions. Orders for these recommended tests are listed in the plan section. The patient has been provided with a written plan.    Health  "Maintenance   Topic Date Due    TDAP/TD VACCINES (1 - Tdap) Never done    ZOSTER VACCINE (1 of 2) Never done    COVID-19 Vaccine (1 - 2023-24 season) Never done    LIPID PANEL  07/06/2024    BMI FOLLOWUP  07/06/2024    INFLUENZA VACCINE  08/01/2024    ANNUAL WELLNESS VISIT  07/10/2025    MAMMOGRAM  08/08/2025    DXA SCAN  08/08/2025    COLORECTAL CANCER SCREENING  02/17/2031    HEPATITIS C SCREENING  Completed    Pneumococcal Vaccine 65+  Discontinued                  CMS Preventative Services Quick Reference  Risk Factors Identified During Encounter  Inactivity/Sedentary: Patient was advised to exercise at least 150 minutes a week per CDC recommendations.  The above risks/problems have been discussed with the patient.  Pertinent information has been shared with the patient in the After Visit Summary.  An After Visit Summary and PPPS were made available to the patient.    Follow Up:   Next Medicare Wellness visit to be scheduled in 1 year.       Additional E&M Note during same encounter follows:  Patient has multiple medical problems which are significant and separately identifiable that require additional work above and beyond the Medicare Wellness Visit.      Chief Complaint  Medicare Wellness-subsequent    Subjective        HPI  Bigg MABLE Spear is also being seen today for follow up on bp and chol  BP at home runs 110-120 systolic   Stressed riding into office today  Needs refill on bp medication         Objective   Vital Signs:  /68   Pulse 86   Temp 97.3 °F (36.3 °C) (Temporal)   Ht 172.7 cm (68\")   Wt 100 kg (221 lb)   SpO2 98%   BMI 33.60 kg/m²     Physical Exam                    Assessment and Plan   Diagnoses and all orders for this visit:    1. Encounter for subsequent annual wellness visit (AWV) in Medicare patient (Primary)    2. Mixed hyperlipidemia  -     Comprehensive Metabolic Panel; Future  -     Lipid Panel; Future    3. Benign essential HTN  -     Comprehensive Metabolic Panel; " Future  -     Lipid Panel; Future    4. Vitamin D deficiency  -     Vitamin D,25-Hydroxy; Future    5. Encounter for screening mammogram for malignant neoplasm of breast  -     Mammo Screening Digital Tomosynthesis Bilateral With CAD; Future    Other orders  -     amLODIPine (NORVASC) 5 MG tablet; Take 1 tablet by mouth Daily.  Dispense: 90 tablet; Refill: 3    Blood pressures under better control at home.  She needs refills on her amlodipine and that was sent.  She was counseled on the need for weight loss.  She refuses vaccines.  She will continue her current medications.  I ordered a CMP, lipid, vitamin D for follow-up on her blood pressure, cholesterol, and vitamin D deficiency.  She is due her mammogram.  She is up-to-date on her DEXA scan.  She is up-to-date on her colonoscopy.  I will see her back in a year         Follow Up   Return in about 1 year (around 7/10/2025) for Medicare Wellness.  Patient was given instructions and counseling regarding her condition or for health maintenance advice. Please see specific information pulled into the AVS if appropriate.

## 2024-07-10 ENCOUNTER — OFFICE VISIT (OUTPATIENT)
Dept: FAMILY MEDICINE CLINIC | Facility: CLINIC | Age: 74
End: 2024-07-10
Payer: MEDICARE

## 2024-07-10 ENCOUNTER — LAB (OUTPATIENT)
Dept: LAB | Facility: HOSPITAL | Age: 74
End: 2024-07-10
Payer: MEDICARE

## 2024-07-10 VITALS
SYSTOLIC BLOOD PRESSURE: 150 MMHG | BODY MASS INDEX: 33.49 KG/M2 | TEMPERATURE: 97.3 F | HEART RATE: 86 BPM | OXYGEN SATURATION: 98 % | DIASTOLIC BLOOD PRESSURE: 68 MMHG | HEIGHT: 68 IN | WEIGHT: 221 LBS

## 2024-07-10 DIAGNOSIS — E78.2 MIXED HYPERLIPIDEMIA: ICD-10-CM

## 2024-07-10 DIAGNOSIS — I10 BENIGN ESSENTIAL HTN: ICD-10-CM

## 2024-07-10 DIAGNOSIS — E55.9 VITAMIN D DEFICIENCY: ICD-10-CM

## 2024-07-10 DIAGNOSIS — Z12.31 ENCOUNTER FOR SCREENING MAMMOGRAM FOR MALIGNANT NEOPLASM OF BREAST: ICD-10-CM

## 2024-07-10 DIAGNOSIS — Z00.00 ENCOUNTER FOR SUBSEQUENT ANNUAL WELLNESS VISIT (AWV) IN MEDICARE PATIENT: Primary | ICD-10-CM

## 2024-07-10 LAB
25(OH)D3 SERPL-MCNC: 64.2 NG/ML (ref 30–100)
ALBUMIN SERPL-MCNC: 4.4 G/DL (ref 3.5–5.2)
ALBUMIN/GLOB SERPL: 1.3 G/DL
ALP SERPL-CCNC: 85 U/L (ref 39–117)
ALT SERPL W P-5'-P-CCNC: 23 U/L (ref 1–33)
ANION GAP SERPL CALCULATED.3IONS-SCNC: 10.6 MMOL/L (ref 5–15)
AST SERPL-CCNC: 22 U/L (ref 1–32)
BILIRUB SERPL-MCNC: 0.3 MG/DL (ref 0–1.2)
BUN SERPL-MCNC: 16 MG/DL (ref 8–23)
BUN/CREAT SERPL: 20.5 (ref 7–25)
CALCIUM SPEC-SCNC: 9.4 MG/DL (ref 8.6–10.5)
CHLORIDE SERPL-SCNC: 104 MMOL/L (ref 98–107)
CHOLEST SERPL-MCNC: 425 MG/DL (ref 0–200)
CO2 SERPL-SCNC: 25.4 MMOL/L (ref 22–29)
CREAT SERPL-MCNC: 0.78 MG/DL (ref 0.57–1)
EGFRCR SERPLBLD CKD-EPI 2021: 79.8 ML/MIN/1.73
GLOBULIN UR ELPH-MCNC: 3.4 GM/DL
GLUCOSE SERPL-MCNC: 96 MG/DL (ref 65–99)
HDLC SERPL-MCNC: 101 MG/DL (ref 40–60)
LDLC SERPL CALC-MCNC: 317 MG/DL (ref 0–100)
LDLC/HDLC SERPL: 3.08 {RATIO}
POTASSIUM SERPL-SCNC: 4.2 MMOL/L (ref 3.5–5.2)
PROT SERPL-MCNC: 7.8 G/DL (ref 6–8.5)
SODIUM SERPL-SCNC: 140 MMOL/L (ref 136–145)
TRIGL SERPL-MCNC: 66 MG/DL (ref 0–150)
VLDLC SERPL-MCNC: 7 MG/DL (ref 5–40)

## 2024-07-10 PROCEDURE — 1160F RVW MEDS BY RX/DR IN RCRD: CPT | Performed by: FAMILY MEDICINE

## 2024-07-10 PROCEDURE — 1170F FXNL STATUS ASSESSED: CPT | Performed by: FAMILY MEDICINE

## 2024-07-10 PROCEDURE — 1126F AMNT PAIN NOTED NONE PRSNT: CPT | Performed by: FAMILY MEDICINE

## 2024-07-10 PROCEDURE — G0439 PPPS, SUBSEQ VISIT: HCPCS | Performed by: FAMILY MEDICINE

## 2024-07-10 PROCEDURE — 99213 OFFICE O/P EST LOW 20 MIN: CPT | Performed by: FAMILY MEDICINE

## 2024-07-10 PROCEDURE — 3077F SYST BP >= 140 MM HG: CPT | Performed by: FAMILY MEDICINE

## 2024-07-10 PROCEDURE — 3078F DIAST BP <80 MM HG: CPT | Performed by: FAMILY MEDICINE

## 2024-07-10 PROCEDURE — 80053 COMPREHEN METABOLIC PANEL: CPT

## 2024-07-10 PROCEDURE — 1159F MED LIST DOCD IN RCRD: CPT | Performed by: FAMILY MEDICINE

## 2024-07-10 PROCEDURE — 80061 LIPID PANEL: CPT

## 2024-07-10 PROCEDURE — 36415 COLL VENOUS BLD VENIPUNCTURE: CPT

## 2024-07-10 PROCEDURE — 82306 VITAMIN D 25 HYDROXY: CPT

## 2024-07-10 RX ORDER — AMLODIPINE BESYLATE 5 MG/1
5 TABLET ORAL DAILY
Qty: 90 TABLET | Refills: 3 | Status: SHIPPED | OUTPATIENT
Start: 2024-07-10

## 2024-07-10 NOTE — PATIENT INSTRUCTIONS
Keep working to lose weight through healthy eating and exercise.     Advance Directive    Advance directives are legal documents that allow you to make decisions about your health care and medical treatment in case you become unable to communicate for yourself. Advance directives let your wishes be known to family, friends, and health care providers.  Discussing and writing advance directives should happen over time rather than all at once. Advance directives can be changed and updated at any time. There are different types of advance directives, such as:  Medical power of .  Living will.  Do not resuscitate (DNR) order or do not attempt resuscitation (DNAR) order.  Health care proxy and medical power of   A health care proxy is also called a health care agent. This person is appointed to make medical decisions for you when you are unable to make decisions for yourself. Generally, people ask a trusted friend or family member to act as their proxy and represent their preferences. Make sure you have an agreement with your trusted person to act as your proxy. A proxy may have to make a medical decision on your behalf if your wishes are not known.  A medical power of , also called a durable power of  for health care, is a legal document that names your health care proxy. Depending on the laws in your state, the document may need to be:  Signed.  Notarized.  Dated.  Copied.  Witnessed.  Incorporated into your medical record.  You may also want to appoint a trusted person to manage your money in the event you are unable to do so. This is called a durable power of  for finances. It is a separate legal document from the durable power of  for health care. You may choose your health care proxy or someone different to act as your agent in money matters.  If you do not appoint a proxy, or there is a concern that the proxy is not acting in your best interest, a court may appoint a  guardian to act on your behalf.  Living will  A living will is a set of instructions that state your wishes about medical care when you cannot express them yourself. Health care providers should keep a copy of your living will in your medical record. You may want to give a copy to family members or friends. To alert caregivers in case of an emergency, you can place a card in your wallet to let them know that you have a living will and where they can find it. A living will is used if you become:  Terminally ill.  Disabled.  Unable to communicate or make decisions.  The following decisions should be included in your living will:  To use or not to use life support equipment, such as dialysis machines and breathing machines (ventilators).  Whether you want a DNR or DNAR order. This tells health care providers not to use cardiopulmonary resuscitation (CPR) if breathing or heartbeat stops.  To use or not to use tube feeding.  To be given or not to be given food and fluids.  Whether you want comfort (palliative) care when the goal becomes comfort rather than a cure.  Whether you want to donate your organs and tissues.  A living will does not give instructions for distributing your money and property if you should pass away.  DNR or DNAR  A DNR or DNAR order is a request not to have CPR in the event that your heart stops beating or you stop breathing. If a DNR or DNAR order has not been made and shared, a health care provider will try to help any patient whose heart has stopped or who has stopped breathing. If you plan to have surgery, talk with your health care provider about how your DNR or DNAR order will be followed if problems occur.  What if I do not have an advance directive?  Some states assign family decision makers to act on your behalf if you do not have an advance directive. Each state has its own laws about advance directives. You may want to check with your health care provider, , or state  representative about the laws in your state.  Summary  Advance directives are legal documents that allow you to make decisions about your health care and medical treatment in case you become unable to communicate for yourself.  The process of discussing and writing advance directives should happen over time. You can change and update advance directives at any time.  Advance directives may include a medical power of , a living will, and a DNR or DNAR order.  This information is not intended to replace advice given to you by your health care provider. Make sure you discuss any questions you have with your health care provider.  Document Revised: 09/21/2021 Document Reviewed: 09/21/2021  Elsevier Patient Education © 2024 [employer]

## 2024-07-11 NOTE — PROGRESS NOTES
Patient notified of test results. No questions. Patient verbalize understanding. Patient requested a copy of results mailed to her.

## 2024-07-29 ENCOUNTER — TELEPHONE (OUTPATIENT)
Dept: FAMILY MEDICINE CLINIC | Facility: CLINIC | Age: 74
End: 2024-07-29
Payer: MEDICARE

## 2024-07-29 NOTE — TELEPHONE ENCOUNTER
Caller: Bigg Spear    Relationship: Self    Best call back number: [phone]     What form or medical record are you requesting:      Who is requesting this form or medical record from you:      How would you like to receive the form or medical records (pick-up, mail, fax):    If fax, what is the fax number:    If mail, what is the address:    If pick-up, provide patient with address and location details    Timeframe paperwork needed:      Additional notes: PATIENT CALLED AND NEEDS COPY OF THE LAB RESULTS THAT WAS DONE ON 7/10/24 AT THE HOSPITAL .  PLEASE MAIL A COPY OF THE TEST RESULTS TO THE PATIENT HOME ADDRESS AS SOON AS POSSIBLE.

## 2024-07-31 ENCOUNTER — OFFICE VISIT (OUTPATIENT)
Dept: CARDIOLOGY | Facility: CLINIC | Age: 74
End: 2024-07-31
Payer: MEDICARE

## 2024-07-31 VITALS
RESPIRATION RATE: 18 BRPM | BODY MASS INDEX: 33.95 KG/M2 | WEIGHT: 224 LBS | DIASTOLIC BLOOD PRESSURE: 83 MMHG | SYSTOLIC BLOOD PRESSURE: 160 MMHG | HEART RATE: 80 BPM | HEIGHT: 68 IN

## 2024-07-31 DIAGNOSIS — E78.2 MIXED HYPERLIPIDEMIA: Primary | ICD-10-CM

## 2024-07-31 RX ORDER — EZETIMIBE 10 MG/1
10 TABLET ORAL DAILY
Qty: 90 TABLET | Refills: 3 | Status: SHIPPED | OUTPATIENT
Start: 2024-07-31

## 2024-07-31 RX ORDER — ATORVASTATIN CALCIUM 10 MG/1
10 TABLET, FILM COATED ORAL DAILY
Qty: 90 TABLET | Refills: 3 | Status: SHIPPED | OUTPATIENT
Start: 2024-07-31

## 2024-08-12 NOTE — PROGRESS NOTES
Cardiology Clinic Note  Link Monzon MD, PhD    Subjective:     Encounter Date:07/31/2024      Patient ID: Bigg Spear is a 73 y.o. female.    Chief Complaint:  Chief Complaint   Patient presents with    Follow-up       HPI:      I the pleasure to see this 73-year-old female with clinic today.  She has history of hypertension hyperlipidemia palpitations and nonobstructive CAD by heart cath in 2019.  She also was admitted to the hospital with hypertensive emergency with elevated troponin at that time but again did not represent atherothrombotic event.  Her EF has been normal.  She has no clinical heart failure.  There was some concern over some LVH which was hypertension related.  She has had hyperlipidemia with HDL that was very good at 103 with LDL of 244 and triglycerides of 82.  Repeats as documented below were also abnormal with LDL of 298 HDL of 91 total cholesterol of 407 in July 2023 with recommendations for PCSK9 inhibitors with multiple statins tried but intolerant severely.  We also marked this on prior encounters, she has tried multiple statins with significant myalgias joint pains with significant intolerance in the past.  PCSK9 inhalers were discussed today as documented below. Blood pressure is high today and she says she has whitecoat syndrome at as high as 190 systolic in the past and is better controlled at home down in the 130s systolic reportedly.  She is chest pain-free.   Home blood pressure logs reviewed by me demonstrate anywhere from 1 15-1 45 systolic mostly with some outliers.  She denies any chest pain syncope palpitations or other complaints     EKG reviewed and interpreted by me today demonstrates sinus rhythm with possible LVH, nonspecific ST-T wave abnormalities in the lateral leads     Review of systems otherwise negative x14 point review of systems except as mentioned above  Historical data copied forward from previous encounters in EMR including the history, exam, and  assessment/plan has been reviewed and is unchanged unless noted otherwise.     Cardiac medicines reviewed with risk, benefits, and necessity of each discussed.     Risk and benefit of cardiac testing reviewed including death heart attack stroke pain bleeding infection need for vascular /cardiovascular surgery were discussed and the patient      Objective:      Blood pressure somewhat better but still remains slightly elevated 152/85, heart rate 89, weight 226, at home her blood pressures are less than 140 systolic she  says on home logs provided      Physical Exam  Regular rate and rhythm  Faint benign 1 out of 6 systolic ejection murmur left sternal border  No clubbing cyanosis or edema  Radial pulses 2+ equal laterally  Normal cap refill  No carotid bruits or JVD  Tach grossly  Clear to auscultation bilaterally  Skin is warm and dry neck        Assessment:            Essential hypertension  LVH  History of hypertensive emergency  History of demand ischemia type II MI 2019  Preserved EF  No evidence of any heart failure  Hyperlipidemia, benign by fractionated LDL, not on statin therapy at this time or aspirin  Nonobstructive luminal regularities by heart cath 2019  Palpitations     Blood pressure remains high at times, 160/83 today  Home blood pressure logs twice daily goal less than 135 systolic  Continue amlodipine  LVH, goal blood pressure less than 135 systolic    Weight gain 10 pounds year-over-year, diet exercise per AHA guidelines, caloric restriction and low-cholesterol diet     Most recent lipid panel with severely elevated LDL in July 2023 at 298 with total cholesterol of 407, did not change with dietary changes and in fact she gained weight over this period of time  However HDL was good at greater than 80, patient has had attempts at multiple statin intolerance   Try atorvastatin 10 daily combination with Zetia  Fasting lipid panel in 3 months  PCSK9 inhibitors are indicated in her clinical scenario.   "Work-up for Repatha, information provided on last clinic, her and her  would like to repeat studies with better control of diet and decide at later date.     Routine diet and exercise per AHA guidelines heart healthy Mediterranean diet was advised  Activity 3-4 times a week  Continue blood pressure logs     Back to clinic 6 months    Objective:         /83 (BP Location: Left arm, Patient Position: Sitting)   Pulse 80   Resp 18   Ht 172.7 cm (68\")   Wt 102 kg (224 lb)   BMI 34.06 kg/m²     Diagnoses and all orders for this visit:    1. Mixed hyperlipidemia (Primary)  -     Lipid Panel; Future    Other orders  -     atorvastatin (LIPITOR) 10 MG tablet; Take 1 tablet by mouth Daily.  Dispense: 90 tablet; Refill: 3  -     ezetimibe (ZETIA) 10 MG tablet; Take 1 tablet by mouth Daily.  Dispense: 90 tablet; Refill: 3           Plan:              The pleasure to be involved in this patient's cardiovascular care.  Please call with any questions or concerns  Link Monzon MD, PhD    Most recent EKG as reviewed and interpreted by me:    ECG 12 Lead    Date/Time: 7/31/2024 8:21 AM  Performed by: Link Monzon MD    Authorized by: Link Monzon MD  Comparison: not compared with previous ECG   Previous ECG: no previous ECG available  Rhythm: sinus rhythm  Rate: normal  QRS axis: normal  Other findings: left ventricular hypertrophy    Clinical impression: abnormal EKG           Most recent echo as reviewed and interpreted by me:      Most recent stress test as reviewed and interpreted by me:      Most recent cardiac catheterization as reviewed interpreted by me:  No results found for this or any previous visit.    The following portions of the patient's history were reviewed and updated as appropriate: allergies, current medications, past family history, past medical history, past social history, past surgical history, and problem list.      ROS:  14 point review of systems negative except " "as mentioned above    Current Outpatient Medications:     amLODIPine (NORVASC) 5 MG tablet, Take 1 tablet by mouth Daily., Disp: 90 tablet, Rfl: 3    Multiple Vitamins-Minerals (MULTIVITAMIN WITH MINERALS) tablet tablet, Take 1 tablet by mouth Daily. Do not take dos, Disp: , Rfl:     NON FORMULARY, Blood pressure formulary- has multiple supplements as ingredients Do not take dos, Disp: , Rfl:     Omega-3 Fatty Acids (EQL OMEGA 3 FISH OIL) 1400 MG capsule, Take 1 capsule by mouth 2 (Two) Times a Day., Disp: , Rfl:     atorvastatin (LIPITOR) 10 MG tablet, Take 1 tablet by mouth Daily., Disp: 90 tablet, Rfl: 3    ezetimibe (ZETIA) 10 MG tablet, Take 1 tablet by mouth Daily., Disp: 90 tablet, Rfl: 3    Garlic 1000 MG capsule, Take 1 capsule by mouth Daily. Do not take dos (Patient not taking: Reported on 7/10/2024), Disp: , Rfl:     Problem List:  Patient Active Problem List   Diagnosis    Mixed hyperlipidemia    Coronary artery disease due to lipid rich plaque    Anxiety disorder    Carpal tunnel syndrome    Encounter for subsequent annual wellness visit (AWV) in Medicare patient    History of prosthetic heart valve    Obesity    Encounter for screening for malignant neoplasm of colon    Postmenopausal status    Skin lesion    Vitamin D deficiency    Benign essential HTN    Palpitations    Dyslipidemia     Past Medical History:  Past Medical History:   Diagnosis Date    Abdominal pain 02/23021    Anesthesia     was told \"we almost lost you\" after anesthesia, but cannot further explain.  Was told she was given too much anesthesia    Anxiety     Borderline diabetes     Constipation 02/2021    Coronary artery disease due to lipid rich plaque 07/17/2019    Coronary artery disease due to lipid rich plaque 07/17/2019    Dyslipidemia 06/10/2020    Encounter for screening for malignant neoplasm of colon 08/26/2015    Fibrocystic breast     Hyperlipidemia     Hypertension     NSTEMI (non-ST elevated myocardial infarction) " 04/27/2018    Dr. Quinn told pt that she did not have NSEMI (but told she did by PCP)    White coat syndrome with high blood pressure but without hypertension      Past Surgical History:  Past Surgical History:   Procedure Laterality Date    CARDIAC CATHETERIZATION  04/28/2019    Mildly elevated left heart filling pressures, normal epicardial anatomy with no significant CAD    CATARACT EXTRACTION, BILATERAL      COLONOSCOPY N/A 2/17/2021    Procedure: COLONOSCOPY WITH POLYPECTOMY X7;  Surgeon: Bang Santamaria MD;  Location: [facility] ENDOSCOPY;  Service: Gastroenterology;  Laterality: N/A;  POLYPS, INTERNAL HEMORRHOIDS, MELANOSIS COLI    D & C AND LAPAROSCOPY       Social History:  Social History     Socioeconomic History    Marital status:    Tobacco Use    Smoking status: Never     Passive exposure: Never    Smokeless tobacco: Never   Vaping Use    Vaping status: Never Used   Substance and Sexual Activity    Alcohol use: No    Drug use: No    Sexual activity: Defer     Allergies:  Allergies   Allergen Reactions    Dioxybenzone Shortness Of Breath    Contrast Dye (Echo Or Unknown Ct/Mr) Arrhythmia    Dapsone Other (See Comments)    Doxycycline Unknown - High Severity    Amoxicillin Palpitations     Immunizations:    There is no immunization history on file for this patient.         In-Office Procedure(s):  No orders to display        ASCVD RIsk Score::  The ASCVD Risk score (Ashok DK, et al., 2019) failed to calculate for the following reasons:    The valid HDL cholesterol range is 20 to 100 mg/dL    The valid total cholesterol range is 130 to 320 mg/dL    Imaging:                 Lab Review:   Lab on 07/10/2024   Component Date Value    Total Cholesterol 07/10/2024 425 (H)     Triglycerides 07/10/2024 66     HDL Cholesterol 07/10/2024 101 (H)     LDL Cholesterol  07/10/2024 317 (H)     VLDL Cholesterol 07/10/2024 7     LDL/HDL Ratio 07/10/2024 3.08     Glucose 07/10/2024 96     BUN 07/10/2024 16      Creatinine 07/10/2024 0.78     Sodium 07/10/2024 140     Potassium 07/10/2024 4.2     Chloride 07/10/2024 104     CO2 07/10/2024 25.4     Calcium 07/10/2024 9.4     Total Protein 07/10/2024 7.8     Albumin 07/10/2024 4.4     ALT (SGPT) 07/10/2024 23     AST (SGOT) 07/10/2024 22     Alkaline Phosphatase 07/10/2024 85     Total Bilirubin 07/10/2024 0.3     Globulin 07/10/2024 3.4     A/G Ratio 07/10/2024 1.3     BUN/Creatinine Ratio 07/10/2024 20.5     Anion Gap 07/10/2024 10.6     eGFR 07/10/2024 79.8     25 Hydroxy, Vitamin D 07/10/2024 64.2      Recent labs reviewed and interpreted for clinical significance and application            Level of Care:           Link Monzon MD  08/12/24  .

## 2024-08-29 ENCOUNTER — HOSPITAL ENCOUNTER (OUTPATIENT)
Dept: MAMMOGRAPHY | Facility: HOSPITAL | Age: 74
Discharge: HOME | End: 2024-08-29
Admitting: FAMILY MEDICINE
Payer: MEDICARE

## 2024-08-29 DIAGNOSIS — Z12.31 ENCOUNTER FOR SCREENING MAMMOGRAM FOR MALIGNANT NEOPLASM OF BREAST: ICD-10-CM

## 2024-08-29 PROCEDURE — 77063 BREAST TOMOSYNTHESIS BI: CPT

## 2024-08-29 PROCEDURE — 77067 SCR MAMMO BI INCL CAD: CPT

## 2024-08-30 NOTE — PROGRESS NOTES
Patient notified of test results. No questions. Patient verbalize understanding. Copy of results mailed to patient.

## 2025-07-14 NOTE — ASSESSMENT & PLAN NOTE
{Hypertension is (optional):[id number]}  She has a component of white coat htn  She will continue current meds    Orders:    Comprehensive Metabolic Panel; Future    Lipid Panel; Future

## 2025-07-14 NOTE — PROGRESS NOTES
Subjective   The ABCs of the Annual Wellness Visit  Medicare Wellness Visit      Bigg Spaer is a 74 y.o. patient who presents for a Medicare Wellness Visit.    The following portions of the patient's history were reviewed and   updated as appropriate: allergies, current medications, past family history, past medical history, past social history, past surgical history, and problem list.    Compared to one year ago, the patient's physical   health is the same.  Compared to one year ago, the patient's mental   health is the same.    Recent Hospitalizations:  She was not admitted to the hospital during the last year.     Current Medical Providers:  Patient Care Team:  Stephanie Morris MD as PCP - General (Family Medicine)  Jess Baez OD (Optometry)  Jarrell Guo MD as Consulting Physician (Ophthalmology)  Link Monzon MD as Consulting Physician (Cardiology)  Bang Santamaria MD as Consulting Physician (Gastroenterology)    Outpatient Medications Prior to Visit   Medication Sig Dispense Refill    amLODIPine (NORVASC) 5 MG tablet Take 1 tablet by mouth Daily. 90 tablet 3    atorvastatin (LIPITOR) 10 MG tablet Take 1 tablet by mouth Daily. 90 tablet 3    ezetimibe (ZETIA) 10 MG tablet Take 1 tablet by mouth Daily. 90 tablet 3    Garlic 1000 MG capsule Take 1 capsule by mouth Daily. Do not take dos      Multiple Vitamins-Minerals (MULTIVITAMIN WITH MINERALS) tablet tablet Take 1 tablet by mouth Daily. Do not take dos      NON FORMULARY Blood pressure formulary- has multiple supplements as ingredients  Do not take dos      Omega-3 Fatty Acids (EQL OMEGA 3 FISH OIL) 1400 MG capsule Take 1 capsule by mouth 2 (Two) Times a Day.       No facility-administered medications prior to visit.     No opioid medication identified on active medication list. I have reviewed chart for other potential  high risk medication/s and harmful drug interactions in the elderly.      Aspirin is not on  "active medication list.  Aspirin use is not indicated based on review of current medical condition/s. Risk of harm outweighs potential benefits.  .    Patient Active Problem List   Diagnosis    Mixed hyperlipidemia    Coronary artery disease due to lipid rich plaque    Anxiety disorder    Carpal tunnel syndrome    Encounter for subsequent annual wellness visit (AWV) in Medicare patient    History of prosthetic heart valve    Obesity    Encounter for screening for malignant neoplasm of colon    Postmenopausal status    Skin lesion    Vitamin D deficiency    Benign essential HTN    Palpitations    Dyslipidemia     Advance Care Planning Advance Directive is not on file.  ACP discussion was held with the patient during this visit. Patient does not have an advance directive, information provided.            Objective   Vitals:    07/15/25 1035 07/15/25 1101   BP: 160/80 150/82   BP Location: Left arm    Patient Position: Sitting    Cuff Size: Large Adult    Pulse: 72    Temp: 98.4 °F (36.9 °C)    TempSrc: Temporal    SpO2: 98%    Weight: 101 kg (222 lb 3.2 oz)    Height: 172.7 cm (68\")    PainSc: 0-No pain        Estimated body mass index is 33.79 kg/m² as calculated from the following:    Height as of this encounter: 172.7 cm (68\").    Weight as of this encounter: 101 kg (222 lb 3.2 oz).    BMI is >= 30 and <35. (Class 1 Obesity). The following options were offered after discussion;: exercise counseling/recommendations           Does the patient have evidence of cognitive impairment? No   MMSE done 30/30                                                                                             Health  Risk Assessment    Smoking Status:  Social History     Tobacco Use   Smoking Status Never    Passive exposure: Never   Smokeless Tobacco Never     Alcohol Consumption:  Social History     Substance and Sexual Activity   Alcohol Use No       Fall Risk Screen  STEADI Fall Risk Assessment was completed, and patient is at LOW " risk for falls.Assessment completed on:7/15/2025    Depression Screening   Little interest or pleasure in doing things? Not at all   Feeling down, depressed, or hopeless? Not at all   PHQ-2 Total Score 0      Health Habits and Functional and Cognitive Screenin/15/2025    10:38 AM   Functional & Cognitive Status   Do you have difficulty preparing food and eating? No   Do you have difficulty bathing yourself, getting dressed or grooming yourself? No   Do you have difficulty using the toilet? No   Do you have difficulty moving around from place to place? No   Do you have trouble with steps or getting out of a bed or a chair? No   Current Diet Well Balanced Diet   Dental Exam Up to date   Eye Exam Up to date   Exercise (times per week) 6 times per week   Current Exercises Include Walking   Do you need help using the phone?  No   Are you deaf or do you have serious difficulty hearing?  No   Do you need help to go to places out of walking distance? No   Do you need help shopping? No   Do you need help preparing meals?  No   Do you need help with housework?  No   Do you need help with laundry? No   Do you need help taking your medications? No   Do you need help managing money? No   Do you ever drive or ride in a car without wearing a seat belt? No   Have you felt unusual fatigue (could be tiredness), stress, anger or loneliness in the last month? No   Who do you live with? Spouse   If you need help, do you have trouble finding someone available to you? No   Have you been bothered in the last four weeks by sexual problems? No   Do you have difficulty concentrating, remembering or making decisions? No           Age-appropriate Screening Schedule:  Refer to the list below for future screening recommendations based on patient's age, sex and/or medical conditions. Orders for these recommended tests are listed in the plan section. The patient has been provided with a written plan.    Health Maintenance List  Health  "Maintenance   Topic Date Due    TDAP/TD VACCINES (1 - Tdap) Never done    ZOSTER VACCINE (1 of 2) Never done    COVID-19 Vaccine (1 - 2024-25 season) Never done    RSV Vaccine - Adults (1 - 1-dose 75+ series) Never done    LIPID PANEL  07/10/2025    DXA SCAN  08/08/2025    INFLUENZA VACCINE  10/01/2025    ANNUAL WELLNESS VISIT  07/15/2026    COLORECTAL CANCER SCREENING  02/17/2031    HEPATITIS C SCREENING  Completed    Pneumococcal Vaccine 50+  Discontinued    MAMMOGRAM  Discontinued                                                                                                                                                CMS Preventative Services Quick Reference  Risk Factors Identified During Encounter  Inactivity/Sedentary: Patient was advised to exercise at least 150 minutes a week per CDC recommendations.    The above risks/problems have been discussed with the patient.  Pertinent information has been shared with the patient in the After Visit Summary.  An After Visit Summary and PPPS were made available to the patient.    Follow Up:   Next Medicare Wellness visit to be scheduled in 1 year.         Additional E&M Note during same encounter follows:  Patient has additional, significant, and separately identifiable condition(s)/problem(s) that require work above and beyond the Medicare Wellness Visit     Chief Complaint  Medicare Wellness-subsequent    Subjective   HPI  Sanjose is also being seen today for additional medical problem/s: follow up on bp/chol/vitamin D  Feels well  BP at home runs 110-120 systolic  Feels well    Review of Systems   Constitutional: Negative.    Cardiovascular: Negative.               Objective   Vital Signs:  /82   Pulse 72   Temp 98.4 °F (36.9 °C) (Temporal)   Ht 172.7 cm (68\")   Wt 101 kg (222 lb 3.2 oz)   SpO2 98%   BMI 33.79 kg/m²   Physical Exam  Vitals and nursing note reviewed.   Constitutional:       Appearance: Normal appearance.   Cardiovascular:      Rate and " Rhythm: Normal rate and regular rhythm.      Heart sounds: Normal heart sounds.   Pulmonary:      Effort: Pulmonary effort is normal.      Breath sounds: Normal breath sounds.   Musculoskeletal:      Right lower leg: No edema.      Left lower leg: No edema.   Neurological:      Mental Status: She is alert and oriented to person, place, and time.   Psychiatric:         Mood and Affect: Mood normal.                    Assessment and Plan      Encounter for subsequent annual wellness visit (AWV) in Medicare patient  Counseled on the need for weight loss through increased activity and improved diet  Encouraged her to get vaccines tess flu       Benign essential HTN    She has a component of white coat htn  She will continue current meds    Orders:    Comprehensive Metabolic Panel; Future    Lipid Panel; Future    Dyslipidemia  She will continue atorvastatin  Orders:    Comprehensive Metabolic Panel; Future    Lipid Panel; Future    Vitamin D deficiency    Orders:    Vitamin D,25-Hydroxy; Future    Encounter for screening mammogram for malignant neoplasm of breast    Orders:    Mammo Screening Digital Tomosynthesis Bilateral With CAD; Future    Postmenopausal status    Orders:    DEXA Bone Density Axial; Future            Follow Up   Return in about 1 year (around 7/15/2026) for Medicare Wellness.  Patient was given instructions and counseling regarding her condition or for health maintenance advice. Please see specific information pulled into the AVS if appropriate.

## 2025-07-14 NOTE — ASSESSMENT & PLAN NOTE
Counseled on the need for weight loss through increased activity and improved diet  Encouraged her to get vaccines tess flu

## 2025-07-14 NOTE — ASSESSMENT & PLAN NOTE
She will continue atorvastatin  Orders:    Comprehensive Metabolic Panel; Future    Lipid Panel; Future

## 2025-07-15 ENCOUNTER — RESULTS FOLLOW-UP (OUTPATIENT)
Dept: FAMILY MEDICINE CLINIC | Facility: CLINIC | Age: 75
End: 2025-07-15

## 2025-07-15 ENCOUNTER — LAB (OUTPATIENT)
Dept: LAB | Facility: HOSPITAL | Age: 75
End: 2025-07-15
Payer: MEDICARE

## 2025-07-15 ENCOUNTER — OFFICE VISIT (OUTPATIENT)
Dept: FAMILY MEDICINE CLINIC | Facility: CLINIC | Age: 75
End: 2025-07-15
Payer: MEDICARE

## 2025-07-15 VITALS
OXYGEN SATURATION: 98 % | BODY MASS INDEX: 33.68 KG/M2 | HEART RATE: 72 BPM | HEIGHT: 68 IN | TEMPERATURE: 98.4 F | SYSTOLIC BLOOD PRESSURE: 150 MMHG | WEIGHT: 222.2 LBS | DIASTOLIC BLOOD PRESSURE: 82 MMHG

## 2025-07-15 DIAGNOSIS — Z78.0 POSTMENOPAUSAL STATUS: ICD-10-CM

## 2025-07-15 DIAGNOSIS — I10 BENIGN ESSENTIAL HTN: ICD-10-CM

## 2025-07-15 DIAGNOSIS — E55.9 VITAMIN D DEFICIENCY: ICD-10-CM

## 2025-07-15 DIAGNOSIS — E78.5 DYSLIPIDEMIA: ICD-10-CM

## 2025-07-15 DIAGNOSIS — Z12.31 ENCOUNTER FOR SCREENING MAMMOGRAM FOR MALIGNANT NEOPLASM OF BREAST: ICD-10-CM

## 2025-07-15 DIAGNOSIS — Z00.00 ENCOUNTER FOR SUBSEQUENT ANNUAL WELLNESS VISIT (AWV) IN MEDICARE PATIENT: Primary | ICD-10-CM

## 2025-07-15 LAB
25(OH)D3 SERPL-MCNC: 67.7 NG/ML (ref 30–100)
ALBUMIN SERPL-MCNC: 4.3 G/DL (ref 3.5–5.2)
ALBUMIN/GLOB SERPL: 1.3 G/DL
ALP SERPL-CCNC: 77 U/L (ref 39–117)
ALT SERPL W P-5'-P-CCNC: 32 U/L (ref 1–33)
ANION GAP SERPL CALCULATED.3IONS-SCNC: 10 MMOL/L (ref 5–15)
AST SERPL-CCNC: 32 U/L (ref 1–32)
BILIRUB SERPL-MCNC: 0.3 MG/DL (ref 0–1.2)
BUN SERPL-MCNC: 12 MG/DL (ref 8–23)
BUN/CREAT SERPL: 16.7 (ref 7–25)
CALCIUM SPEC-SCNC: 9.4 MG/DL (ref 8.6–10.5)
CHLORIDE SERPL-SCNC: 104 MMOL/L (ref 98–107)
CHOLEST SERPL-MCNC: 421 MG/DL (ref 0–200)
CO2 SERPL-SCNC: 25 MMOL/L (ref 22–29)
CREAT SERPL-MCNC: 0.72 MG/DL (ref 0.57–1)
EGFRCR SERPLBLD CKD-EPI 2021: 87.3 ML/MIN/1.73
GLOBULIN UR ELPH-MCNC: 3.3 GM/DL
GLUCOSE SERPL-MCNC: 88 MG/DL (ref 65–99)
HDLC SERPL-MCNC: 108 MG/DL (ref 40–60)
LDLC SERPL CALC-MCNC: 298 MG/DL (ref 0–100)
LDLC/HDLC SERPL: 2.71 {RATIO}
POTASSIUM SERPL-SCNC: 4 MMOL/L (ref 3.5–5.2)
PROT SERPL-MCNC: 7.6 G/DL (ref 6–8.5)
SODIUM SERPL-SCNC: 139 MMOL/L (ref 136–145)
TRIGL SERPL-MCNC: 99 MG/DL (ref 0–150)
VLDLC SERPL-MCNC: 15 MG/DL (ref 5–40)

## 2025-07-15 PROCEDURE — 1160F RVW MEDS BY RX/DR IN RCRD: CPT | Performed by: FAMILY MEDICINE

## 2025-07-15 PROCEDURE — 80053 COMPREHEN METABOLIC PANEL: CPT

## 2025-07-15 PROCEDURE — G0439 PPPS, SUBSEQ VISIT: HCPCS | Performed by: FAMILY MEDICINE

## 2025-07-15 PROCEDURE — 80061 LIPID PANEL: CPT

## 2025-07-15 PROCEDURE — 3079F DIAST BP 80-89 MM HG: CPT | Performed by: FAMILY MEDICINE

## 2025-07-15 PROCEDURE — 36415 COLL VENOUS BLD VENIPUNCTURE: CPT

## 2025-07-15 PROCEDURE — 99213 OFFICE O/P EST LOW 20 MIN: CPT | Performed by: FAMILY MEDICINE

## 2025-07-15 PROCEDURE — 1159F MED LIST DOCD IN RCRD: CPT | Performed by: FAMILY MEDICINE

## 2025-07-15 PROCEDURE — 82306 VITAMIN D 25 HYDROXY: CPT

## 2025-07-15 PROCEDURE — G2211 COMPLEX E/M VISIT ADD ON: HCPCS | Performed by: FAMILY MEDICINE

## 2025-07-15 PROCEDURE — 3077F SYST BP >= 140 MM HG: CPT | Performed by: FAMILY MEDICINE

## 2025-07-15 PROCEDURE — 1126F AMNT PAIN NOTED NONE PRSNT: CPT | Performed by: FAMILY MEDICINE

## 2025-07-15 PROCEDURE — 1170F FXNL STATUS ASSESSED: CPT | Performed by: FAMILY MEDICINE

## 2025-07-15 NOTE — LETTER
Bigg MABLE Spear  [address]   [address]  [place] [id number]    July 16, 2025     Dear Ms. Spear:    Below are the results from your recent visit:    Resulted Orders   Vitamin D,25-Hydroxy   Result Value Ref Range    25 Hydroxy, Vitamin D 67.7 30.0 - 100.0 ng/ml   Comprehensive Metabolic Panel   Result Value Ref Range    Glucose 88 65 - 99 mg/dL    BUN 12.0 8.0 - 23.0 mg/dL    Creatinine 0.72 0.57 - 1.00 mg/dL    Sodium 139 136 - 145 mmol/L    Potassium 4.0 3.5 - 5.2 mmol/L    Chloride 104 98 - 107 mmol/L    CO2 25.0 22.0 - 29.0 mmol/L    Calcium 9.4 8.6 - 10.5 mg/dL    Total Protein 7.6 6.0 - 8.5 g/dL    Albumin 4.3 3.5 - 5.2 g/dL    ALT (SGPT) 32 1 - 33 U/L    AST (SGOT) 32 1 - 32 U/L    Alkaline Phosphatase 77 39 - 117 U/L    Total Bilirubin 0.3 0.0 - 1.2 mg/dL    Globulin 3.3 gm/dL    A/G Ratio 1.3 g/dL    BUN/Creatinine Ratio 16.7 7.0 - 25.0    Anion Gap 10.0 5.0 - 15.0 mmol/L    eGFR 87.3 >60.0 mL/min/1.73   Lipid Panel   Result Value Ref Range    Total Cholesterol 421 (H) 0 - 200 mg/dL    Triglycerides 99 0 - 150 mg/dL    HDL Cholesterol 108 (H) 40 - 60 mg/dL    LDL Cholesterol  298 (H) 0 - 100 mg/dL    VLDL Cholesterol 15 5 - 40 mg/dL    LDL/HDL Ratio 2.71        The test results show that your current treatment is the following:    Chol is still terrible  She can discuss with her cardiologist at her upcoming appt  Vitamin D level is normal  BS, kidney and liver functions are normal    If you have any questions or concerns, please don't hesitate to call.         Sincerely,        Stephanie Morris MD

## 2025-07-15 NOTE — PATIENT INSTRUCTIONS
Keep working to lose weight through healthy eating and exercise.     Advance Directive    Advance directives are legal papers that state your wishes about health care decisions. They let your wishes be known to family, friends, and health care providers if you become unable to speak for yourself.   You should write these papers out over time rather than all at once. They can be changed and updated at any time.  The types of advance directives include:  Medical power of  (POA).  Living mondragon.  Do not resuscitate (DNR) or do not attempt resuscitation (DNAR) orders.  What are a health care proxy and medical POA?  A health care proxy is also called a health care agent. It's a person you choose to make medical decisions for you when you can't make them for yourself. In most cases, a proxy is a trusted friend or family member.  A medical POA is legal paperwork that names your proxy. It may need to be:  Signed.  Notarized.  Dated.  Copied.  Witnessed.  Added to your medical record.  You may also want to choose someone to handle your money if you can't do so. This is called a durable POA for finances. It's separate from a medical POA. You may choose your health care proxy or someone else to act as your agent in money matters.  If you don't have a proxy, or if the proxy may not be acting in your best interest, a court may choose a guardian to act on your behalf.  What is a living will?  A living will is legal paperwork that states your wishes about medical care. Providers should keep a copy of it in your medical record. You may want to give a copy to family members or friends. You can also keep a card in your wallet to let loved ones know you have a living will and where they can find it. A living will may be used if:  You're very sick with something that will end your life.  You become disabled.  You can't make decisions or speak for yourself.  Your living will should include whether:  To use or not use life support  equipment. This may include machines to filter your blood or to help you breathe.  You want a DNR or DNAR order. This tells providers not to use CPR if your heart or breathing stops.  To use or not use tube feeding.  You want to be given foods and fluids.  You want a type of comfort care called palliative care. This may be given when the goal for treatment becomes comfort rather than a cure.  You want to donate your organs and tissues.  A living will doesn't say what to do with your money and property if you pass away.  What is a DNR or DNAR?  A DNR or DNAR order is a request not to have CPR. If you don't have one of these orders, a provider will try to help you if your heart stops or you stop breathing.   If you plan to have surgery, talk with your provider about your DNR or DNAR order.  What happens if I don't have an advance directive?  Each state has its own laws about advance directives. Some states assign family decision makers to act on your behalf if you don't have an advance directive.   Check with your provider, , or state representative about the laws in your state.  Where to find more information  Each state has its own laws about advance directives. You can look up these laws at: [email or web address]  This information is not intended to replace advice given to you by your health care provider. Make sure you discuss any questions you have with your health care provider.  Document Revised: 05/06/2024 Document Reviewed: 05/06/2024  [employer] Patient Education © 2024 [employer]

## 2025-07-28 RX ORDER — AMLODIPINE BESYLATE 5 MG/1
5 TABLET ORAL DAILY
Qty: 90 TABLET | Refills: 0 | Status: SHIPPED | OUTPATIENT
Start: 2025-07-28

## 2025-08-07 ENCOUNTER — EXTERNAL PBMM DATA (OUTPATIENT)
Dept: PHARMACY | Facility: OTHER | Age: 75
End: 2025-08-07
Payer: MEDICARE

## 2025-08-19 RX ORDER — ATORVASTATIN CALCIUM 10 MG/1
10 TABLET, FILM COATED ORAL DAILY
Qty: 90 TABLET | Refills: 0 | Status: SHIPPED | OUTPATIENT
Start: 2025-08-19

## 2025-08-21 ENCOUNTER — EXTERNAL PBMM DATA (OUTPATIENT)
Dept: PHARMACY | Facility: OTHER | Age: 75
End: 2025-08-21
Payer: MEDICARE

## (undated) DEVICE — PK ENDO GI 50

## (undated) DEVICE — PAPR PRNT PK SONY W RIBN UPC55

## (undated) DEVICE — TRAP WIDEEYE POLYP

## (undated) DEVICE — SNAR POLYP CAPTIVATOR2 RND STFF 2.4 25MM 240CM